# Patient Record
Sex: FEMALE | Race: WHITE | NOT HISPANIC OR LATINO | URBAN - METROPOLITAN AREA
[De-identification: names, ages, dates, MRNs, and addresses within clinical notes are randomized per-mention and may not be internally consistent; named-entity substitution may affect disease eponyms.]

---

## 2023-10-11 ENCOUNTER — CLINICAL SUPPORT (OUTPATIENT)
Dept: INTERNAL MEDICINE | Facility: CLINIC | Age: 28
End: 2023-10-11
Payer: COMMERCIAL

## 2023-10-11 ENCOUNTER — OFFICE VISIT (OUTPATIENT)
Dept: INTERNAL MEDICINE | Facility: CLINIC | Age: 28
End: 2023-10-11
Payer: COMMERCIAL

## 2023-10-11 ENCOUNTER — PATIENT MESSAGE (OUTPATIENT)
Dept: INTERNAL MEDICINE | Facility: CLINIC | Age: 28
End: 2023-10-11

## 2023-10-11 VITALS — TEMPERATURE: 97 F

## 2023-10-11 DIAGNOSIS — J02.9 SORE THROAT: ICD-10-CM

## 2023-10-11 DIAGNOSIS — R05.9 COUGH, UNSPECIFIED TYPE: Primary | ICD-10-CM

## 2023-10-11 LAB
CTP QC/QA: YES
CTP QC/QA: YES
MOLECULAR STREP A: NEGATIVE
POC MOLECULAR INFLUENZA A AGN: NEGATIVE
POC MOLECULAR INFLUENZA B AGN: NEGATIVE
SARS-COV-2 RNA RESP QL NAA+PROBE: NOT DETECTED

## 2023-10-11 PROCEDURE — 99999 PR PBB SHADOW E&M-EST. PATIENT-LVL I: ICD-10-PCS | Mod: PBBFAC,,,

## 2023-10-11 PROCEDURE — 99203 OFFICE O/P NEW LOW 30 MIN: CPT | Mod: 95,,, | Performed by: STUDENT IN AN ORGANIZED HEALTH CARE EDUCATION/TRAINING PROGRAM

## 2023-10-11 PROCEDURE — 87635 SARS-COV-2 COVID-19 AMP PRB: CPT | Performed by: STUDENT IN AN ORGANIZED HEALTH CARE EDUCATION/TRAINING PROGRAM

## 2023-10-11 PROCEDURE — 99203 PR OFFICE/OUTPT VISIT, NEW, LEVL III, 30-44 MIN: ICD-10-PCS | Mod: 95,,, | Performed by: STUDENT IN AN ORGANIZED HEALTH CARE EDUCATION/TRAINING PROGRAM

## 2023-10-11 PROCEDURE — 87651 STREP A DNA AMP PROBE: CPT | Mod: QW,NDTC,, | Performed by: STUDENT IN AN ORGANIZED HEALTH CARE EDUCATION/TRAINING PROGRAM

## 2023-10-11 PROCEDURE — 99999 PR PBB SHADOW E&M-EST. PATIENT-LVL I: CPT | Mod: PBBFAC,,,

## 2023-10-11 PROCEDURE — 87502 POCT INFLUENZA A/B MOLECULAR: ICD-10-PCS | Mod: QW,NDTC,, | Performed by: STUDENT IN AN ORGANIZED HEALTH CARE EDUCATION/TRAINING PROGRAM

## 2023-10-11 PROCEDURE — 87502 INFLUENZA DNA AMP PROBE: CPT | Mod: QW,NDTC,, | Performed by: STUDENT IN AN ORGANIZED HEALTH CARE EDUCATION/TRAINING PROGRAM

## 2023-10-11 PROCEDURE — 87651 POCT STREP A MOLECULAR: ICD-10-PCS | Mod: QW,NDTC,, | Performed by: STUDENT IN AN ORGANIZED HEALTH CARE EDUCATION/TRAINING PROGRAM

## 2023-10-11 RX ORDER — AZELASTINE 1 MG/ML
2 SPRAY, METERED NASAL 2 TIMES DAILY
Qty: 30 ML | Refills: 2 | Status: SHIPPED | OUTPATIENT
Start: 2023-10-11 | End: 2024-10-10

## 2023-10-11 RX ORDER — PROMETHAZINE HYDROCHLORIDE AND DEXTROMETHORPHAN HYDROBROMIDE 6.25; 15 MG/5ML; MG/5ML
5 SYRUP ORAL EVERY 6 HOURS PRN
Qty: 120 ML | Refills: 0 | Status: SHIPPED | OUTPATIENT
Start: 2023-10-11 | End: 2023-10-21

## 2023-10-11 RX ORDER — BENZONATATE 100 MG/1
100 CAPSULE ORAL 3 TIMES DAILY PRN
Qty: 30 CAPSULE | Refills: 0 | Status: SHIPPED | OUTPATIENT
Start: 2023-10-11 | End: 2023-10-21

## 2023-10-11 NOTE — PROGRESS NOTES
The patient's location is:  [place]  The chief complaint leading to consultation is:  Cough, unspecified type [R05.9]    Visit type: audiovisual    Time spent in discussion with the patient: 9 minutes  14 minutes of total time spent on the encounter. This includes time spent in discussion with the patient and time preparing for the patient appointment: review of tests, obtaining and/or reviewing separately obtained history, documenting clinical information in the electronic or other health record, independently interpreting results (not separately reported), and communicating results to the patient/family/caregiver or care coordination (not separately reported).     Each patient to whom he or she provides medical services by telemedicine is: (1) informed of the relationship between the physician and patient and the respective role of any other health care provider with respect to management of the patient; and (2) notified that he or she may decline to receive medical services by telemedicine and may withdraw from such care at any time.      Subjective:   Torie Wallace is a 28 y.o. female who presents for Cough, unspecified type [R05.9].       Patient is new to me. Here today for the following:    Endorses sinus congestion, sinus pressure, non-productive cough, chills, sore throat, and nausea starting Saturday.  Denies fever, SOB.   Has tried Dayquil and Nyquil without significant improvement.  Tolerating PO intake without difficulty.   Endorses roommate was sick with similar illness recently.   Tested negative for COVID on Sunday.      Cough  This is a new problem. The current episode started in the past 7 days. The problem has been waxing and waning. The problem occurs hourly. The cough is Productive of sputum. Associated symptoms include ear congestion, ear pain, headaches, nasal congestion, rhinorrhea and a sore throat. Pertinent negatives include no chest pain, chills, fever, heartburn, hemoptysis, myalgias,  postnasal drip, rash, shortness of breath, sweats, weight loss or wheezing. Nothing aggravates the symptoms. She has tried OTC cough suppressant for the symptoms. The treatment provided mild relief. There is no history of asthma, bronchiectasis, bronchitis, COPD, emphysema, environmental allergies or pneumonia.        Review of Systems   Constitutional:  Negative for chills, fever and weight loss.   HENT:  Positive for ear pain, rhinorrhea and sore throat. Negative for postnasal drip.    Respiratory:  Positive for cough. Negative for hemoptysis, shortness of breath and wheezing.    Cardiovascular:  Negative for chest pain.   Gastrointestinal:  Negative for heartburn.   Musculoskeletal:  Negative for myalgias.   Skin:  Negative for rash.   Allergic/Immunologic: Negative for environmental allergies.   Neurological:  Positive for headaches.         Current Outpatient Medications   Medication Instructions    azelastine (ASTELIN) 274 mcg, Nasal, 2 times daily    benzonatate (TESSALON) 100 mg, Oral, 3 times daily PRN    promethazine-dextromethorphan (PROMETHAZINE-DM) 6.25-15 mg/5 mL Syrp 5 mLs, Oral, Every 6 hours PRN       Objective:     Vitals:    10/11/23 1301   Temp: 97.3 °F (36.3 °C)        Physical Exam  Constitutional:       General: She is not in acute distress.     Appearance: Normal appearance. She is not ill-appearing or diaphoretic.   Neurological:      Mental Status: She is alert.   Psychiatric:         Attention and Perception: Attention normal.         Mood and Affect: Mood and affect normal.         Speech: Speech normal.           Assessment/Plan:       Cough, unspecified type  Sore throat  For body aches, headaches, and/or fevers:   - Tylenol 1,000 mg every 8 hours or 500 mg every 4 hours.    - Ibuprofen 600 mg every 6 hours.  For chest congestion try Mucinex 1,200 mg twice daily (must be well hydrated for the medication to work).   Sore throat can also be treated with warm tea with honey and salt water  gargling (8 oz warm water with 1/4 tsp salt).   Try nasal saline rinses using only sterile or distilled water 1-2 times daily.   Ensure adequate hydration.   Recommend for every 3 glasses of water 1 glass of electrolyte solution such as Pedialyte.  Sent promethazine-DM for cough to pharmacy.   Sent azelastine for sinus congestion to pharmacy.    Return to clinic in 3-5 days if symptoms worsening or unimproved.  -     azelastine (ASTELIN) 137 mcg (0.1 %) nasal spray; 2 sprays (274 mcg total) by Nasal route 2 (two) times daily.  -     promethazine-dextromethorphan (PROMETHAZINE-DM) 6.25-15 mg/5 mL Syrp; Take 5 mLs by mouth every 6 (six) hours as needed (cough).  -     POCT Strep A, Molecular  -     POCT Influenza A/B Molecular  -     COVID-19 Routine Screening  -     benzonatate (TESSALON) 100 MG capsule; Take 1 capsule (100 mg total) by mouth 3 (three) times daily as needed for Cough.        Jazmin Gerardo MD  10/11/2023

## 2024-03-28 ENCOUNTER — OFFICE VISIT (OUTPATIENT)
Dept: OBSTETRICS AND GYNECOLOGY | Facility: CLINIC | Age: 29
End: 2024-03-28
Attending: OBSTETRICS & GYNECOLOGY
Payer: COMMERCIAL

## 2024-03-28 VITALS
BODY MASS INDEX: 26.46 KG/M2 | SYSTOLIC BLOOD PRESSURE: 122 MMHG | WEIGHT: 155 LBS | HEIGHT: 64 IN | DIASTOLIC BLOOD PRESSURE: 68 MMHG

## 2024-03-28 DIAGNOSIS — Z01.419 WELL FEMALE EXAM WITH ROUTINE GYNECOLOGICAL EXAM: Primary | ICD-10-CM

## 2024-03-28 DIAGNOSIS — Z11.3 SCREENING FOR VENEREAL DISEASE: ICD-10-CM

## 2024-03-28 PROCEDURE — 87491 CHLMYD TRACH DNA AMP PROBE: CPT | Performed by: OBSTETRICS & GYNECOLOGY

## 2024-03-28 PROCEDURE — 88175 CYTOPATH C/V AUTO FLUID REDO: CPT | Performed by: OBSTETRICS & GYNECOLOGY

## 2024-03-28 PROCEDURE — 99999 PR PBB SHADOW E&M-EST. PATIENT-LVL III: CPT | Mod: PBBFAC,,, | Performed by: OBSTETRICS & GYNECOLOGY

## 2024-03-28 PROCEDURE — 87624 HPV HI-RISK TYP POOLED RSLT: CPT | Performed by: OBSTETRICS & GYNECOLOGY

## 2024-03-28 PROCEDURE — 3078F DIAST BP <80 MM HG: CPT | Mod: CPTII,S$GLB,, | Performed by: OBSTETRICS & GYNECOLOGY

## 2024-03-28 PROCEDURE — 1159F MED LIST DOCD IN RCRD: CPT | Mod: CPTII,S$GLB,, | Performed by: OBSTETRICS & GYNECOLOGY

## 2024-03-28 PROCEDURE — 3074F SYST BP LT 130 MM HG: CPT | Mod: CPTII,S$GLB,, | Performed by: OBSTETRICS & GYNECOLOGY

## 2024-03-28 PROCEDURE — 1160F RVW MEDS BY RX/DR IN RCRD: CPT | Mod: CPTII,S$GLB,, | Performed by: OBSTETRICS & GYNECOLOGY

## 2024-03-28 PROCEDURE — 99385 PREV VISIT NEW AGE 18-39: CPT | Mod: S$GLB,,, | Performed by: OBSTETRICS & GYNECOLOGY

## 2024-03-28 PROCEDURE — 3008F BODY MASS INDEX DOCD: CPT | Mod: CPTII,S$GLB,, | Performed by: OBSTETRICS & GYNECOLOGY

## 2024-03-28 RX ORDER — VALACYCLOVIR HYDROCHLORIDE 500 MG/1
500 TABLET, FILM COATED ORAL DAILY
Qty: 90 TABLET | Refills: 3 | Status: SHIPPED | OUTPATIENT
Start: 2024-03-28 | End: 2025-03-28

## 2024-03-28 RX ORDER — VALACYCLOVIR HYDROCHLORIDE 500 MG/1
500 TABLET, FILM COATED ORAL 2 TIMES DAILY
COMMUNITY
End: 2024-03-28 | Stop reason: SDUPTHER

## 2024-03-28 NOTE — PROGRESS NOTES
SUBJECTIVE:   29 y.o. female   for routine gyn exam. No LMP recorded (lmp unknown)..  She has no unusual complaints. She has h/o PCOS and has h/o irregular cycles. Has been on OCP's in the past, but feels better off of them. Sometimes takes prometrium if amenorrhea for more than 3 months.  Uses condoms for contraception. Desires STD screen and annual labs.         Past Medical History:   Diagnosis Date    Abnormal Pap smear of cervix     HSV-2 (herpes simplex virus 2) infection     PCOS (polycystic ovarian syndrome)      Past Surgical History:   Procedure Laterality Date    AUGMENTATION OF BREAST      DILATION AND CURETTAGE OF UTERUS      AUB    PELVIC LAPAROSCOPY      pelvic pain- no endo     Social History     Socioeconomic History    Marital status: Single   Tobacco Use    Smoking status: Unknown   Substance and Sexual Activity    Sexual activity: Yes     Birth control/protection: None, Condom     Family History   Problem Relation Age of Onset    Ovarian cancer Paternal Grandmother     Breast cancer Neg Hx     Colon cancer Neg Hx      OB History    Para Term  AB Living   0 0 0 0 0 0   SAB IAB Ectopic Multiple Live Births   0 0 0 0 0         Current Outpatient Medications   Medication Sig Dispense Refill    azelastine (ASTELIN) 137 mcg (0.1 %) nasal spray 2 sprays (274 mcg total) by Nasal route 2 (two) times daily. (Patient not taking: Reported on 3/28/2024) 30 mL 2    valACYclovir (VALTREX) 500 MG tablet Take 1 tablet (500 mg total) by mouth once daily. 90 tablet 3     No current facility-administered medications for this visit.     Allergies: Tapentadol     ROS:  Constitutional: no weight loss, weight gain, fever, fatigue  Eyes:  No vision changes, glasses/contacts  ENT/Mouth: No ulcers, sinus problems, ears ringing, headache  Cardiovascular: No inability to lie flat, chest pain, exercise intolerance, swelling, heart palpitations  Respiratory: No wheezing, coughing blood, shortness of  "breath, or cough  Gastrointestinal: No diarrhea, bloody stool, nausea/vomiting, constipation, gas, hemorrhoids  Genitourinary: No blood in urine, painful urination, urgency of urination, frequency of urination, incomplete emptying, incontinence, abnormal bleeding, painful periods, heavy periods, vaginal discharge, vaginal odor, painful intercourse, sexual problems, bleeding after intercourse.  Musculoskeletal: No muscle weakness  Skin/Breast: No painful breasts, nipple discharge, masses, rash, ulcers  Neurological: No passing out, seizures, numbness, headache  Endocrine: No diabetes, hypothyroid, hyperthyroid, hot flashes, hair loss, abnormal hair growth, acne  Psychiatric: No depression, crying  Hematologic: No bruises, bleeding, swollen lymph nodes, anemia.      OBJECTIVE:   The patient appears well, alert, oriented x 3, in no distress.  /68   Ht 5' 4" (1.626 m)   Wt 70.3 kg (154 lb 15.7 oz)   LMP  (LMP Unknown)   BMI 26.60 kg/m²   NECK: no thyromegaly, trachea midline  SKIN: no acne, striae, hirsutism  CHEST: CTAB  CV: RRR  BREAST EXAM: breasts appear normal, no suspicious masses, no skin or nipple changes or axillary nodes  ABDOMEN: no hernias, masses, or hepatosplenomegaly  GENITALIA: normal external genitalia, no erythema, no discharge  URETHRA: normal urethra, normal urethral meatus  VAGINA: Normal  CERVIX: no lesions or cervical motion tenderness  UTERUS: normal size, contour, position, consistency, mobility, non-tender  ADNEXA: no mass, fullness, tenderness      ASSESSMENT:   1. Well female exam with routine gynecological exam  Liquid-Based Pap Smear, Screening    Lipid Panel    CBC Auto Differential    Comprehensive Metabolic Panel    TSH    T4, Free      2. Screening for venereal disease  C. trachomatis/N. gonorrhoeae by AMP DNA          PLAN:   Orders Placed This Encounter    C. trachomatis/N. gonorrhoeae by AMP DNA    Lipid Panel    CBC Auto Differential    Comprehensive Metabolic Panel    TSH "    T4, Free    Liquid-Based Pap Smear, Screening    valACYclovir (VALTREX) 500 MG tablet     Discussed HSV, Valtrex, PCOS, tracking cycles.  Discussed healthy lifestyle including regular exercise, healthy eating, etc.  Return to clinic in 1 year

## 2024-03-30 LAB
C TRACH DNA SPEC QL NAA+PROBE: NOT DETECTED
N GONORRHOEA DNA SPEC QL NAA+PROBE: NOT DETECTED

## 2024-04-08 LAB

## 2024-07-24 ENCOUNTER — E-VISIT (OUTPATIENT)
Dept: FAMILY MEDICINE | Facility: CLINIC | Age: 29
End: 2024-07-24
Payer: COMMERCIAL

## 2024-07-24 ENCOUNTER — ON-DEMAND VIRTUAL (OUTPATIENT)
Dept: URGENT CARE | Facility: CLINIC | Age: 29
End: 2024-07-24
Payer: COMMERCIAL

## 2024-07-24 VITALS — BODY MASS INDEX: 25.61 KG/M2 | WEIGHT: 150 LBS | HEIGHT: 64 IN

## 2024-07-24 DIAGNOSIS — U07.1 COVID: Primary | ICD-10-CM

## 2024-07-24 DIAGNOSIS — R05.2 SUBACUTE COUGH: Primary | ICD-10-CM

## 2024-07-24 DIAGNOSIS — J02.9 SORE THROAT: ICD-10-CM

## 2024-07-24 DIAGNOSIS — U07.1 COVID: ICD-10-CM

## 2024-07-24 PROCEDURE — 99213 OFFICE O/P EST LOW 20 MIN: CPT | Mod: 95,,, | Performed by: PHYSICIAN ASSISTANT

## 2024-07-24 RX ORDER — PROMETHAZINE HYDROCHLORIDE AND DEXTROMETHORPHAN HYDROBROMIDE 6.25; 15 MG/5ML; MG/5ML
5 SYRUP ORAL EVERY 6 HOURS PRN
Qty: 118 ML | Refills: 1 | Status: SHIPPED | OUTPATIENT
Start: 2024-07-24 | End: 2024-08-03

## 2024-07-24 RX ORDER — NAPROXEN 500 MG/1
500 TABLET ORAL 2 TIMES DAILY PRN
Qty: 14 TABLET | Refills: 0 | Status: SHIPPED | OUTPATIENT
Start: 2024-07-24 | End: 2024-07-31

## 2024-07-24 RX ORDER — NIRMATRELVIR AND RITONAVIR 300-100 MG
KIT ORAL
Qty: 30 TABLET | Refills: 0 | Status: SHIPPED | OUTPATIENT
Start: 2024-07-24 | End: 2024-07-29

## 2024-07-24 NOTE — PATIENT INSTRUCTIONS
Continue to isolate. Rest, drink plenty of fluids (goal is urine to be light yellow/clear for adequate hydration).   2. Recommend warm salt gargles or tea with honey for throat discomfort and cough. May use nasal saline rinses/netti pot for nasal/sinus congestion.  Tylenol or Ibuprofen recommended as needed for fever or pain control.  3. I have sent Paxlovid to your pharmacy, please take as directed.  Side effects reviewed.   4. Monitor your symptoms for worsening and seek immediate medical attention at emergency room if develop chest pain, difficulty breathing, lightheadedness/dizziness, syncope, dark colored urine. Would also recommend to purchase a pulse oximetry to monitor your oxygen level. If it goes below 95% please go to nearest urgent care or emergency room for in person evaluation. Please reconnect for another online visit or see your PCP or urgent care provider if symptoms worsen or new symptoms appear at any point.  5. Isolation period may end when, for at least 24 hours, symptoms are improving overall, and fever has been gone without use of a fever-reducing medication. For an additional 5 days, you will need to continue to take prevention strategies such as wear a mask at all times, good hygiene practices, keeping a distance from others.  6. You must understand that you've received a Telehealth Urgent Care treatment only and that you may be released before all your medical problems are known or treated. You, the patient, will arrange for follow up care as instructed.

## 2024-07-24 NOTE — PROGRESS NOTES
"Patient ID: Torie Wallace is a 29 y.o. female.    Chief Complaint: URI (Entered automatically based on patient selection in [email or web address].) and Cough          274}  The patient initiated a request through [email or web address] on 7/24/2024 for evaluation and management with a chief complaint of URI (Entered automatically based on patient selection in [email or web address].) and Cough     I evaluated the questionnaire submission on 07/24/2024 .    Total Time (in minutes): 5     [facility] Peq E-Visit Covid    7/24/2024  2:04 PM CDT - Filed by Patient   Do you agree to participate in an E-Visit? Yes   If you have any of the following symptoms, go to your local emergency room or call 911: I acknowledge   Are you pregnant, could you be pregnant, or are you breast feeding? None of the above   What is the main issue you would like addressed today? Covid   Do you think you might have COVID or the Flu? Yes COVID   Have you tested positive for COVID or Flu? Yes COVID   What symptoms do you have? Chills;  Cough;  Fatigue;  Fever;  Headache;  Nasal congestion;  Muscle or body aches;  Nausea;  Runny nose;  Sore throat   When did your symptoms first appear? 7/23/2024   List what you have done or taken to help your symptoms. Dayquil & ibuprofen - havent really helped   Provide any additional information you feel is important. Had a virtual visit today and was prescribed Paxlovid. I am wondering if i could also get something to relieve my cough and sore throat. My throat is currently so scratchy it hurts to swallow and i am coughing constantly   Please attach any relevant images or files    Are you able to take your vital signs? No          There are no problems to display for this patient.     Recent Labs Obtained:  No results found for: "WBC", "HGB", "HCT", "MCV", "PLT", "NA", "K", "GLU", "CREATININE", "EGFRNORACEVR", "HGBA1C", "TSH"   Review of patient's allergies indicates:   Allergen Reactions    Tapentadol Itching       Encounter Diagnoses   Name Primary?    " Subacute cough Yes    COVID     Sore throat         No orders of the defined types were placed in this encounter.     Medications Ordered This Encounter   Medications    naproxen (NAPROSYN) 500 MG tablet     Sig: Take 1 tablet (500 mg total) by mouth 2 (two) times daily as needed (pain).     Dispense:  14 tablet     Refill:  0    promethazine-dextromethorphan (PROMETHAZINE-DM) 6.25-15 mg/5 mL Syrp     Sig: Take 5 mLs by mouth every 6 (six) hours as needed (cough).     Dispense:  118 mL     Refill:  1        E-Visit Time Tracking:    Day 1 Time (in minutes): 5    Total Time (in minutes): 5      274}

## 2024-07-24 NOTE — PROGRESS NOTES
"Subjective:      Patient ID: Torie Wallace is a 29 y.o. female.    Vitals:  height is 5' 4" (1.626 m) and weight is 68 kg (150 lb).     Chief Complaint: COVID-19 Concerns      Visit Type: TELE AUDIOVISUAL    Present with the patient at the time of consultation: TELEMED PRESENT WITH PATIENT: None outside in [place]    Past Medical History:   Diagnosis Date    Abnormal Pap smear of cervix     HSV-2 (herpes simplex virus 2) infection     PCOS (polycystic ovarian syndrome)      Past Surgical History:   Procedure Laterality Date    AUGMENTATION OF BREAST      DILATION AND CURETTAGE OF UTERUS      AUB    PELVIC LAPAROSCOPY      pelvic pain- no endo     Review of patient's allergies indicates:   Allergen Reactions    Tapentadol Itching     Current Outpatient Medications on File Prior to Visit   Medication Sig Dispense Refill    azelastine (ASTELIN) 137 mcg (0.1 %) nasal spray 2 sprays (274 mcg total) by Nasal route 2 (two) times daily. (Patient not taking: Reported on 3/28/2024) 30 mL 2    valACYclovir (VALTREX) 500 MG tablet Take 1 tablet (500 mg total) by mouth once daily. 90 tablet 3     No current facility-administered medications on file prior to visit.     Family History   Problem Relation Name Age of Onset    Ovarian cancer Paternal Grandmother      Breast cancer Neg Hx      Colon cancer Neg Hx             [facility] Peq Odvv Intake    7/24/2024 11:16 AM CDT - Filed by Patient   What is your current physical address in the event of a medical emergency? [address] [place] [address]   Are you able to take your vital signs? No   Please attach any relevant images or files          HPI  30yo female presents with c/o sore throat x yesterday, worsening neck glands swollen, headache, chills, sweats, nasal congestion, cough. Does feel more winded than normal with walking stairs but otherwise no dyspnea, chest tightness, wheezing. Feel like got hit by a bus. Positive home COVID test.     Denies POP. "         Constitution: Positive for chills, sweating, fatigue and fever.   HENT:  Positive for congestion and sore throat. Negative for ear pain and trouble swallowing.    Respiratory:  Positive for cough and shortness of breath (with exertion going up the stairs). Negative for chest tightness and wheezing.    Gastrointestinal:  Negative for abdominal pain, nausea, vomiting and diarrhea.   Skin:  Negative for rash.   Neurological:  Positive for headaches. Negative for dizziness and light-headedness.        Objective:   The physical exam was conducted virtually.  Physical Exam   Constitutional: She is oriented to person, place, and time.  Non-toxic appearance. She does not appear ill. No distress.   HENT:   Head: Normocephalic and atraumatic.   Nose: Right sinus exhibits frontal sinus tenderness. Right sinus exhibits no maxillary sinus tenderness. Left sinus exhibits frontal sinus tenderness. Left sinus exhibits no maxillary sinus tenderness.   Neck: Neck supple.   Pulmonary/Chest: Effort normal. No respiratory distress.         Comments: Walking during video, no increased work of breathing noted.     Abdominal: Normal appearance.   Neurological: She is alert and oriented to person, place, and time. Coordination normal.   Skin: Skin is dry, not diaphoretic, not pale and no rash.   Psychiatric: Her behavior is normal. Judgment and thought content normal.       Assessment:     1. COVID        Plan:       COVID      Pt interested in antiviral therapy. Paxlovid treatment reviewed - indications, benefits, side effects, interactions. Pt would like to proceed.     Continue to isolate. Rest, drink plenty of fluids (goal is urine to be light yellow/clear for adequate hydration).   2. Recommend warm salt gargles or tea with honey for throat discomfort and cough. May use nasal saline rinses/netti pot for nasal/sinus congestion.  Tylenol or Ibuprofen recommended as needed for fever or pain control.  3. I have sent Paxlovid to  your pharmacy, please take as directed.    4. Monitor your symptoms for worsening and seek immediate medical attention at emergency room if develop chest pain, difficulty breathing, lightheadedness/dizziness, syncope, dark colored urine. Would also recommend to purchase a pulse oximetry to monitor your oxygen level. If it goes below 95% please go to nearest urgent care or emergency room for in person evaluation. Please reconnect for another online visit or see your PCP or urgent care provider if symptoms worsen or new symptoms appear at any point.  5. Isolation period may end when, for at least 24 hours, symptoms are improving overall, and fever has been gone without use of a fever-reducing medication. For an additional 5 days, you will need to continue to take prevention strategies such as wear a mask at all times, good hygiene practices, keeping a distance from others.  6. You must understand that you've received a Telehealth Urgent Care treatment only and that you may be released before all your medical problems are known or treated. You, the patient, will arrange for follow up care as instructed.     Patient indicates understanding and agreement with plan.

## 2024-07-24 NOTE — LETTER
July 24, 2024    Torie Wallace  [address] [facility] [facility] [address]             Virtual Visit - Urgent Care  Urgent Care  [address] [facility] [id number]   July 24, 2024     Patient: Torie Wallace   YOB: 1995   Date of Visit: 7/24/2024       To Whom it May Concern:    Torie Wallace was seen virtually on 7/24/2024. Please excuse her from work today 7/24/2024 through Friday 7/26/2024.    If you have any questions or concerns, please don't hesitate to call.    Sincerely,         Inse Thompson PA-C

## 2024-08-02 ENCOUNTER — OFFICE VISIT (OUTPATIENT)
Dept: OBSTETRICS AND GYNECOLOGY | Facility: CLINIC | Age: 29
End: 2024-08-02
Payer: COMMERCIAL

## 2024-08-02 ENCOUNTER — PATIENT MESSAGE (OUTPATIENT)
Dept: OBSTETRICS AND GYNECOLOGY | Facility: CLINIC | Age: 29
End: 2024-08-02

## 2024-08-02 VITALS
WEIGHT: 148.38 LBS | DIASTOLIC BLOOD PRESSURE: 88 MMHG | SYSTOLIC BLOOD PRESSURE: 142 MMHG | BODY MASS INDEX: 25.33 KG/M2 | HEIGHT: 64 IN

## 2024-08-02 DIAGNOSIS — M54.9 BACK PAIN, UNSPECIFIED BACK LOCATION, UNSPECIFIED BACK PAIN LATERALITY, UNSPECIFIED CHRONICITY: Primary | ICD-10-CM

## 2024-08-02 DIAGNOSIS — R10.2 PELVIC CRAMPING: ICD-10-CM

## 2024-08-02 DIAGNOSIS — N89.8 VAGINAL DISCHARGE: ICD-10-CM

## 2024-08-02 LAB
B-HCG UR QL: NEGATIVE
BILIRUB UR QL STRIP: NEGATIVE
CTP QC/QA: YES
GLUCOSE UR QL STRIP: NEGATIVE
KETONES UR QL STRIP: NEGATIVE
LEUKOCYTE ESTERASE UR QL STRIP: NEGATIVE
PH, POC UA: 7
POC BLOOD, URINE: NEGATIVE
POC NITRATES, URINE: NEGATIVE
PROT UR QL STRIP: NEGATIVE
SP GR UR STRIP: 1.01 (ref 1–1.03)
UROBILINOGEN UR STRIP-ACNC: NORMAL (ref 0.1–1.1)

## 2024-08-02 PROCEDURE — 87086 URINE CULTURE/COLONY COUNT: CPT | Performed by: OBSTETRICS & GYNECOLOGY

## 2024-08-02 PROCEDURE — 87591 N.GONORRHOEAE DNA AMP PROB: CPT | Performed by: OBSTETRICS & GYNECOLOGY

## 2024-08-02 PROCEDURE — 99999 PR PBB SHADOW E&M-EST. PATIENT-LVL III: CPT | Mod: PBBFAC,,, | Performed by: OBSTETRICS & GYNECOLOGY

## 2024-08-02 RX ORDER — ONDANSETRON 4 MG/1
4 TABLET, ORALLY DISINTEGRATING ORAL EVERY 6 HOURS PRN
Qty: 30 TABLET | Refills: 0 | Status: SHIPPED | OUTPATIENT
Start: 2024-08-02 | End: 2024-09-01

## 2024-08-02 RX ORDER — CYCLOBENZAPRINE HCL 5 MG
5 TABLET ORAL EVERY 8 HOURS PRN
Qty: 30 TABLET | Refills: 0 | Status: SHIPPED | OUTPATIENT
Start: 2024-08-02

## 2024-08-02 NOTE — PROGRESS NOTES
Subjective     Patient ID: Torie Wallace is a 29 y.o. female.    Chief Complaint:  Low-back Pain and Nausea      History of Present Illness  30yo  presents for worsening right lower back/pelvic pain and nausea.  States pain started about 10 days ago, then had cough/covid symptoms for which she was treated and now better from that standpoint.  However, the pain has remained persistent, worse with certain movements, like crossing legs.  Denies fevers/chills.  Has been sexually active during this time without worsening pain.  She has also noticed increased urinary frequency and urgency, but denies dysuria or hematuria.  No incontinence.  Also reports nausea with the pain, no vomiting, but decreased appetite.  H/o irregular cycles, often has to take progesterone to induce cycle, but had normal cycle on her own .  Heavier than usual but had not had prior cycle since .  Not currently on any contraception, does not desire any.  No other complaints today.     Low-back Pain  Associated symptoms include abdominal pain, anorexia and nausea. Pertinent negatives include no chest pain, chills, fever, headaches, rash or vomiting.   Nausea  Associated symptoms include abdominal pain, anorexia and nausea. Pertinent negatives include no chest pain, chills, fever, headaches, rash or vomiting.   Pelvic Pain  The patient's primary symptoms include pelvic pain. This is a new problem. The current episode started in the past 7 days. The problem occurs constantly. The problem has been gradually worsening. The pain is severe. The problem affects the right side. She is not pregnant. Associated symptoms include abdominal pain, anorexia, back pain, diarrhea, flank pain, frequency, nausea and urgency. Pertinent negatives include no chills, constipation, discolored urine, dysuria, fever, headaches, hematuria, painful intercourse, rash or vomiting. The symptoms are aggravated by activity and heavy lifting. She has tried heating pad,  "NSAIDs and warm bath for the symptoms. The treatment provided no relief. She is sexually active. No, her partner does not have an STD. She uses nothing for contraception. Her menstrual history has been irregular. Her past medical history is significant for a gynecological surgery, herpes simplex, metrorrhagia and ovarian cysts. There is no history of an abdominal surgery, a  section, an ectopic pregnancy, endometriosis, menorrhagia, miscarriage, perineal abscess, PID, an STD, a terminated pregnancy or vaginosis.         GYN & OB History  Patient's last menstrual period was 2024.   Date of Last Pap: 2024    OB History    Para Term  AB Living   0 0 0 0 0 0   SAB IAB Ectopic Multiple Live Births   0 0 0 0 0       Review of Systems  Review of Systems   Constitutional:  Negative for chills and fever.   Respiratory:  Negative for shortness of breath.    Cardiovascular:  Negative for chest pain.   Gastrointestinal:  Positive for abdominal pain, anorexia, diarrhea and nausea. Negative for constipation and vomiting.   Genitourinary:  Positive for flank pain, frequency, pelvic pain and urgency. Negative for dysuria, hematuria and menorrhagia.   Musculoskeletal:  Positive for back pain.   Integumentary:  Negative for rash.   Neurological:  Negative for headaches.          Objective   Physical Exam    BP (!) 142/88   Ht 5' 4" (1.626 m)   Wt 67.3 kg (148 lb 5.9 oz)   LMP 2024   BMI 25.47 kg/m²     Gen: NAD  Resp: Normal respiratory effort  Abd: soft, NT, no guarding/rebound.  +TTP in right lower back/flank, but no CVAT.   Pelvic: Normal-appearing external female genitalia.  Small amount of thin white discharge noted.  No CMT.  Uterus small, mobile, nontender.  No adnexal masses or tenderness.    Ext: normal ROM  Psych: appropriate affect  Neuro: grossly intact         Assessment and Plan     Torie Burris" was seen today for low-back pain and nausea.    Diagnoses and all orders for this " visit:    Back pain, unspecified back location, unspecified back pain laterality, unspecified chronicity  -     POCT Urinalysis, Dipstick, Automated, W/O Scope  -     US Pelvis Comp with Transvag NON-OB (xpd; Future  -     US Kidney; Future    Pelvic cramping  -     C. trachomatis/N. gonorrhoeae by AMP DNA [facility]; Vagina  -     POCT Urine Pregnancy  -     US Pelvis Comp with Transvag NON-OB (xpd; Future  -     US Kidney; Future  -     CULTURE, URINE  -     C. trachomatis/N. gonorrhoeae by AMP DNA [facility]; Cervix    Vaginal discharge  -     Vaginosis Screen by DNA Probe; Future  -     Vaginosis Screen by DNA Probe  -     CULTURE, URINE  -     C. trachomatis/N. gonorrhoeae by AMP DNA [facility]; Cervix          Plan:  Symptoms and exam reviewed with patient - overall reassuring exam, but still concerning for possible kidney stone vs ovarian cyst vs musculoskeletal.   Vaginal and urine cultures pending.  Will get pelvic and renal US.   eRx flexeril and zofran, can use with NSAIDs prn.   ED precautions given.    Counseling done, precautions given, all questions answered.  Will message with results.  RTC prn.

## 2024-08-03 LAB — BACTERIA UR CULT: ABNORMAL

## 2024-08-04 RX ORDER — METRONIDAZOLE 500 MG/1
500 TABLET ORAL EVERY 12 HOURS
Qty: 14 TABLET | Refills: 0 | Status: SHIPPED | OUTPATIENT
Start: 2024-08-04 | End: 2024-08-11

## 2024-08-04 RX ORDER — SULFAMETHOXAZOLE AND TRIMETHOPRIM 800; 160 MG/1; MG/1
1 TABLET ORAL 2 TIMES DAILY
Qty: 14 TABLET | Refills: 0 | Status: SHIPPED | OUTPATIENT
Start: 2024-08-04 | End: 2024-08-11

## 2024-08-06 ENCOUNTER — HOSPITAL ENCOUNTER (OUTPATIENT)
Dept: RADIOLOGY | Facility: OTHER | Age: 29
Discharge: HOME | End: 2024-08-06
Attending: OBSTETRICS & GYNECOLOGY
Payer: COMMERCIAL

## 2024-08-06 ENCOUNTER — TELEPHONE (OUTPATIENT)
Dept: OBSTETRICS AND GYNECOLOGY | Facility: CLINIC | Age: 29
End: 2024-08-06
Payer: COMMERCIAL

## 2024-08-06 DIAGNOSIS — R10.2 PELVIC CRAMPING: ICD-10-CM

## 2024-08-06 DIAGNOSIS — M54.9 BACK PAIN, UNSPECIFIED BACK LOCATION, UNSPECIFIED BACK PAIN LATERALITY, UNSPECIFIED CHRONICITY: ICD-10-CM

## 2024-08-06 PROCEDURE — 76775 US EXAM ABDO BACK WALL LIM: CPT | Mod: TC

## 2024-08-06 PROCEDURE — 76775 US EXAM ABDO BACK WALL LIM: CPT | Mod: 26,,, | Performed by: RADIOLOGY

## 2024-08-06 PROCEDURE — 76830 TRANSVAGINAL US NON-OB: CPT | Mod: 26,,, | Performed by: RADIOLOGY

## 2024-08-06 PROCEDURE — 76856 US EXAM PELVIC COMPLETE: CPT | Mod: 26,,, | Performed by: RADIOLOGY

## 2024-08-06 PROCEDURE — 76856 US EXAM PELVIC COMPLETE: CPT | Mod: TC

## 2024-08-06 PROCEDURE — 76830 TRANSVAGINAL US NON-OB: CPT | Mod: TC

## 2024-08-07 ENCOUNTER — TELEPHONE (OUTPATIENT)
Dept: OBSTETRICS AND GYNECOLOGY | Facility: HOSPITAL | Age: 29
End: 2024-08-07
Payer: COMMERCIAL

## 2024-08-07 DIAGNOSIS — N92.6 IRREGULAR PERIODS/MENSTRUAL CYCLES: Primary | ICD-10-CM

## 2024-08-13 ENCOUNTER — TELEPHONE (OUTPATIENT)
Dept: OBSTETRICS AND GYNECOLOGY | Facility: CLINIC | Age: 29
End: 2024-08-13
Payer: COMMERCIAL

## 2024-08-13 DIAGNOSIS — N89.8 VAGINAL DISCHARGE: Primary | ICD-10-CM

## 2024-08-13 NOTE — TELEPHONE ENCOUNTER
----- Message from Kaylah Perry sent at 8/13/2024  9:09 AM CDT -----  Name of Who is Calling:NANDINI BAKER [[id number]]                What is the request in detail: Pt calling asking if she could have some yeast infection medication.Please call back to further assist.      [id number] - [place] [address] [facility] [address]           Can the clinic reply by [facility]: No                What Number to Call Back if not [facility]: [phone]

## 2024-08-15 ENCOUNTER — LAB VISIT (OUTPATIENT)
Dept: LAB | Facility: OTHER | Age: 29
End: 2024-08-15
Attending: OBSTETRICS & GYNECOLOGY
Payer: COMMERCIAL

## 2024-08-15 DIAGNOSIS — N92.6 IRREGULAR PERIODS/MENSTRUAL CYCLES: ICD-10-CM

## 2024-08-15 DIAGNOSIS — Z01.419 WELL FEMALE EXAM WITH ROUTINE GYNECOLOGICAL EXAM: ICD-10-CM

## 2024-08-15 LAB
ALBUMIN SERPL BCP-MCNC: 4 G/DL (ref 3.5–5.2)
ALP SERPL-CCNC: 55 U/L (ref 55–135)
ALT SERPL W/O P-5'-P-CCNC: 24 U/L (ref 10–44)
ANION GAP SERPL CALC-SCNC: 12 MMOL/L (ref 8–16)
AST SERPL-CCNC: 19 U/L (ref 10–40)
BASOPHILS # BLD AUTO: 0.04 K/UL (ref 0–0.2)
BASOPHILS NFR BLD: 0.8 % (ref 0–1.9)
BILIRUB SERPL-MCNC: 0.2 MG/DL (ref 0.1–1)
BUN SERPL-MCNC: 10 MG/DL (ref 6–20)
CALCIUM SERPL-MCNC: 9.6 MG/DL (ref 8.7–10.5)
CHLORIDE SERPL-SCNC: 103 MMOL/L (ref 95–110)
CHOLEST SERPL-MCNC: 160 MG/DL (ref 120–199)
CHOLEST/HDLC SERPL: 3 {RATIO} (ref 2–5)
CO2 SERPL-SCNC: 23 MMOL/L (ref 23–29)
CREAT SERPL-MCNC: 0.8 MG/DL (ref 0.5–1.4)
DHEA-S SERPL-MCNC: 195.5 UG/DL (ref 95.8–511.7)
DIFFERENTIAL METHOD BLD: NORMAL
EOSINOPHIL # BLD AUTO: 0.1 K/UL (ref 0–0.5)
EOSINOPHIL NFR BLD: 1.3 % (ref 0–8)
ERYTHROCYTE [DISTWIDTH] IN BLOOD BY AUTOMATED COUNT: 12.6 % (ref 11.5–14.5)
EST. GFR  (NO RACE VARIABLE): >60 ML/MIN/1.73 M^2
ESTRADIOL SERPL-MCNC: 43 PG/ML
FSH SERPL-ACNC: 9.57 MIU/ML
GLUCOSE SERPL-MCNC: 86 MG/DL (ref 70–110)
HCT VFR BLD AUTO: 45.8 % (ref 37–48.5)
HDLC SERPL-MCNC: 53 MG/DL (ref 40–75)
HDLC SERPL: 33.1 % (ref 20–50)
HGB BLD-MCNC: 15.2 G/DL (ref 12–16)
IMM GRANULOCYTES # BLD AUTO: 0.01 K/UL (ref 0–0.04)
IMM GRANULOCYTES NFR BLD AUTO: 0.2 % (ref 0–0.5)
LDLC SERPL CALC-MCNC: 80.4 MG/DL (ref 63–159)
LYMPHOCYTES # BLD AUTO: 2 K/UL (ref 1–4.8)
LYMPHOCYTES NFR BLD: 41.3 % (ref 18–48)
MCH RBC QN AUTO: 30.7 PG (ref 27–31)
MCHC RBC AUTO-ENTMCNC: 33.2 G/DL (ref 32–36)
MCV RBC AUTO: 93 FL (ref 82–98)
MONOCYTES # BLD AUTO: 0.4 K/UL (ref 0.3–1)
MONOCYTES NFR BLD: 8.8 % (ref 4–15)
NEUTROPHILS # BLD AUTO: 2.3 K/UL (ref 1.8–7.7)
NEUTROPHILS NFR BLD: 47.6 % (ref 38–73)
NONHDLC SERPL-MCNC: 107 MG/DL
NRBC BLD-RTO: 0 /100 WBC
PLATELET # BLD AUTO: 430 K/UL (ref 150–450)
PMV BLD AUTO: 9.7 FL (ref 9.2–12.9)
POTASSIUM SERPL-SCNC: 3.8 MMOL/L (ref 3.5–5.1)
PROGEST SERPL-MCNC: 0.2 NG/ML
PROLACTIN SERPL IA-MCNC: 13.9 NG/ML (ref 5.2–26.5)
PROT SERPL-MCNC: 7.2 G/DL (ref 6–8.4)
RBC # BLD AUTO: 4.95 M/UL (ref 4–5.4)
SODIUM SERPL-SCNC: 138 MMOL/L (ref 136–145)
T4 FREE SERPL-MCNC: 1.1 NG/DL (ref 0.71–1.51)
TESTOST SERPL-MCNC: 37 NG/DL (ref 5–73)
TRIGL SERPL-MCNC: 133 MG/DL (ref 30–150)
TSH SERPL DL<=0.005 MIU/L-ACNC: 1.03 UIU/ML (ref 0.4–4)
WBC # BLD AUTO: 4.8 K/UL (ref 3.9–12.7)

## 2024-08-15 PROCEDURE — 82627 DEHYDROEPIANDROSTERONE: CPT | Performed by: OBSTETRICS & GYNECOLOGY

## 2024-08-15 PROCEDURE — 84439 ASSAY OF FREE THYROXINE: CPT | Performed by: OBSTETRICS & GYNECOLOGY

## 2024-08-15 PROCEDURE — 84403 ASSAY OF TOTAL TESTOSTERONE: CPT | Performed by: OBSTETRICS & GYNECOLOGY

## 2024-08-15 PROCEDURE — 80053 COMPREHEN METABOLIC PANEL: CPT | Performed by: OBSTETRICS & GYNECOLOGY

## 2024-08-15 PROCEDURE — 82670 ASSAY OF TOTAL ESTRADIOL: CPT | Performed by: OBSTETRICS & GYNECOLOGY

## 2024-08-15 PROCEDURE — 36415 COLL VENOUS BLD VENIPUNCTURE: CPT | Performed by: OBSTETRICS & GYNECOLOGY

## 2024-08-15 PROCEDURE — 85025 COMPLETE CBC W/AUTO DIFF WBC: CPT | Performed by: OBSTETRICS & GYNECOLOGY

## 2024-08-15 PROCEDURE — 80061 LIPID PANEL: CPT | Performed by: OBSTETRICS & GYNECOLOGY

## 2024-08-15 PROCEDURE — 84146 ASSAY OF PROLACTIN: CPT | Performed by: OBSTETRICS & GYNECOLOGY

## 2024-08-15 PROCEDURE — 84443 ASSAY THYROID STIM HORMONE: CPT | Performed by: OBSTETRICS & GYNECOLOGY

## 2024-08-15 PROCEDURE — 84144 ASSAY OF PROGESTERONE: CPT | Performed by: OBSTETRICS & GYNECOLOGY

## 2024-08-15 PROCEDURE — 83001 ASSAY OF GONADOTROPIN (FSH): CPT | Performed by: OBSTETRICS & GYNECOLOGY

## 2024-08-21 ENCOUNTER — E-VISIT (OUTPATIENT)
Dept: FAMILY MEDICINE | Facility: CLINIC | Age: 29
End: 2024-08-21
Payer: COMMERCIAL

## 2024-08-21 DIAGNOSIS — J02.8 PHARYNGITIS DUE TO OTHER ORGANISM: Primary | ICD-10-CM

## 2024-08-21 RX ORDER — NAPROXEN 500 MG/1
500 TABLET ORAL 2 TIMES DAILY PRN
Qty: 14 TABLET | Refills: 0 | Status: SHIPPED | OUTPATIENT
Start: 2024-08-21 | End: 2024-08-28

## 2024-08-21 RX ORDER — AMOXICILLIN AND CLAVULANATE POTASSIUM 875; 125 MG/1; MG/1
1 TABLET, FILM COATED ORAL 2 TIMES DAILY
Qty: 14 TABLET | Refills: 0 | Status: SHIPPED | OUTPATIENT
Start: 2024-08-21 | End: 2024-08-28

## 2024-08-21 NOTE — PROGRESS NOTES
Patient ID: Torie Wallace is a 29 y.o. female.    Chief Complaint: General Illness (Entered automatically based on patient selection in [employer].)          274}  The patient initiated a request through [employer] on 8/21/2024 for evaluation and management with a chief complaint of General Illness (Entered automatically based on patient selection in [employer].)     I evaluated the questionnaire submission on 08/21/2024 .    Total Time (in minutes): 14     [facility] P[facility] Evisit [facility]group-Cough And Cold    8/21/2024  2:39 PM CDT - Filed by Patient   What do you need help with? Strep Throat   Do you agree to participate in an E-Visit? Yes   If you have any of the following symptoms, go to your local emergency room or call 911: I acknowledge   Are you pregnant, could you be pregnant, or are you breast feeding? None of the above   What is the main issue you would like addressed today? Sore throat, swollen lymph nodes, hurts to swallow, pain in ears, no fever   Do you think you might have COVID or the Flu? No   Have you tested positive for COVID or Flu? No   What symptoms do you currently have?  Sore throat;  Ear pain;  Neck pain   Have you had any of the following? Trouble swallowing   Please enter a few details about your swallowing, breathing, or visual problems. A lot of pain when swallowing   Have you ever smoked? I have never smoked   Have you had a fever? No   When did your symptoms first appear? 8/19/2024   In the last two weeks, have you been in close contact with someone who has COVID-19 or the Flu? No   List what you have done or taken to help your symptoms. Cough drops, sore throat spray, ibuprofen   How severe are your symptoms? Severe   Have your symptoms gotten better or worse since they started?  No change   Do you have transportation to get testing if it is needed and ordered for you at an [facility] location? Yes   Provide any additional information you feel is important.    Please attach any relevant images or files     Are you able to take your vital signs? No          There are no problems to display for this patient.     Recent Labs Obtained:  Lab Results   Component Value Date    WBC 4.80 08/15/2024    HGB 15.2 08/15/2024    HCT 45.8 08/15/2024    MCV 93 08/15/2024     08/15/2024     08/15/2024    K 3.8 08/15/2024    GLU 86 08/15/2024    CREATININE 0.8 08/15/2024    EGFRNORACEVR >60 08/15/2024    TSH 1.028 08/15/2024      Review of patient's allergies indicates:   Allergen Reactions    Tapentadol Itching       Encounter Diagnosis   Name Primary?    Pharyngitis due to other organism Yes        No orders of the defined types were placed in this encounter.     Medications Ordered This Encounter   Medications    amoxicillin-clavulanate 875-125mg (AUGMENTIN) 875-125 mg per tablet     Sig: Take 1 tablet by mouth 2 (two) times daily. for 7 days     Dispense:  14 tablet     Refill:  0    naproxen (NAPROSYN) 500 MG tablet     Sig: Take 1 tablet (500 mg total) by mouth 2 (two) times daily as needed (pain).     Dispense:  14 tablet     Refill:  0        E-Visit Time Tracking:    Day 1 Time (in minutes): 14    Total Time (in minutes): 14      274}

## 2024-12-18 ENCOUNTER — OFFICE VISIT (OUTPATIENT)
Dept: INTERNAL MEDICINE | Facility: CLINIC | Age: 29
End: 2024-12-18

## 2024-12-18 VITALS
DIASTOLIC BLOOD PRESSURE: 92 MMHG | OXYGEN SATURATION: 100 % | HEART RATE: 80 BPM | SYSTOLIC BLOOD PRESSURE: 137 MMHG | BODY MASS INDEX: 23.41 KG/M2 | WEIGHT: 137.13 LBS | TEMPERATURE: 98 F | HEIGHT: 64 IN

## 2024-12-18 DIAGNOSIS — J06.9 VIRAL URI: Primary | ICD-10-CM

## 2024-12-18 DIAGNOSIS — J31.0 RHINITIS, UNSPECIFIED TYPE: ICD-10-CM

## 2024-12-18 LAB
CTP QC/QA: YES
CTP QC/QA: YES
FLUAV AG NPH QL: NEGATIVE
FLUBV AG NPH QL: NEGATIVE
S PYO RRNA THROAT QL PROBE: NEGATIVE

## 2024-12-18 PROCEDURE — 99999 PR PBB SHADOW E&M-EST. PATIENT-LVL III: CPT | Mod: PBBFAC,,, | Performed by: STUDENT IN AN ORGANIZED HEALTH CARE EDUCATION/TRAINING PROGRAM

## 2024-12-18 PROCEDURE — 99999PBSHW POCT RAPID STREP A: Mod: PBBFAC,,,

## 2024-12-18 PROCEDURE — 99999PBSHW POCT INFLUENZA A/B: Mod: 59,PBBFAC,,

## 2024-12-18 PROCEDURE — 99213 OFFICE O/P EST LOW 20 MIN: CPT | Mod: S$PBB,,, | Performed by: STUDENT IN AN ORGANIZED HEALTH CARE EDUCATION/TRAINING PROGRAM

## 2024-12-18 PROCEDURE — 99213 OFFICE O/P EST LOW 20 MIN: CPT | Mod: PBBFAC | Performed by: STUDENT IN AN ORGANIZED HEALTH CARE EDUCATION/TRAINING PROGRAM

## 2024-12-18 PROCEDURE — 87880 STREP A ASSAY W/OPTIC: CPT | Mod: PBBFAC | Performed by: STUDENT IN AN ORGANIZED HEALTH CARE EDUCATION/TRAINING PROGRAM

## 2024-12-18 NOTE — PROGRESS NOTES
"[facility]  Subjective:     Patient ID: Torie Wallace is a 29 y.o. female.  History of Present Illness    CHIEF COMPLAINT:  Patient presents today with a sore throat and congestion.    HISTORY OF PRESENT ILLNESS:  She reports sore throat beginning last week, initially presenting as throat itchiness. The throat discomfort is mild without pain on swallowing. She developed congestion and runny nose yesterday afternoon. She reports mild cough and denies fevers and chills. She reports swollen lymph nodes in the cervical region.    CURRENT MEDICATIONS:  She is taking DayQuil and NyQuil for symptom management.    ALLERGIES:  She reports having general allergies.    SOCIAL HISTORY:  She works in [employer] and [employer].       Current Outpatient Medications   Medication Instructions    valACYclovir (VALTREX) 500 mg, Oral, Daily     Objective:      Body mass index is 23.54 kg/m².  Vitals:    12/18/24 1531   BP: (!) 137/92   Pulse: 80   Temp: 97.7 °F (36.5 °C)   TempSrc: Oral   SpO2: 100%   Weight: 62.2 kg (137 lb 2 oz)   Height: 5' 4" (1.626 m)   PainSc:   6   PainLoc: Generalized     Physical Exam   Physical Exam    General: No acute distress. Normal appearance.  Head: Normocephalic.  Neck:  No cervical lymphadenopathy  Throat:  No pharyngeal exudates  Cardiovascular: Normal rate and rhythm. No murmur heard.  Lungs: Pulmonary effort is normal. Normal breath sounds. No wheezing. No rales.  Abdomen: Flat.  Musculoskeletal: No edema lower extremities.  Skin: Warm. Dry.  Neurological: Alert.  Psychiatric: Normal mood. Normal behavior.       Assessment:       1. Viral URI    2. Rhinitis, unspecified type        Plan:   Assessment & Plan      Negative for flu and strep today, had issues with COVID tests but can defer this since would not qualify for Paxlovid.     Patient presents with symptoms of a viral URI/common cold.  No signs or symptoms of bacterial etiology would warrant antibiotics " and no indication for systemic glucocorticoids.  Discussed use of over-the-counter cold and flu medications for symptoms including 1st generation antihistamines for nocturnal symptom relief and sleep.  Discussed risk of drowsiness and falls.  Discussed use of over-the-counter nasal corticosteroids and azelastine p.r.n. nasal congestion.      Viral URI  -     POCT Influenza A/B  -     POCT rapid strep A  -     Cancel: POCT COVID-19 Rapid Screening    Rhinitis, unspecified type  -     POCT rapid strep A        All of your core healthy metrics are met.    No follow-ups on file. or sooner prn (as needed)          Wili Morales  [facility]  [address] [address]  Phone [phone]  Fax [phone]    Part of this note is dictated using the M*Modal Fluency Direct word recognition program. It may contain word recognition mistakes or wrong word substitutions (commonly he/she and is/was substitutions) that were missed on review.    Part of this note was generated with the assistance of ambient listening technology. Verbal consent was obtained by the patient and accompanying visitor(s) for the recording of patient appointment to facilitate this note. I attest to having reviewed and edited the generated note for accuracy, though some syntax or spelling errors may persist. Please contact the author of this note for any clarification.

## 2025-03-19 ENCOUNTER — OFFICE VISIT (OUTPATIENT)
Dept: INTERNAL MEDICINE | Facility: CLINIC | Age: 30
End: 2025-03-19
Payer: COMMERCIAL

## 2025-03-19 VITALS
WEIGHT: 138.69 LBS | HEIGHT: 64 IN | SYSTOLIC BLOOD PRESSURE: 130 MMHG | HEART RATE: 65 BPM | DIASTOLIC BLOOD PRESSURE: 76 MMHG | BODY MASS INDEX: 23.68 KG/M2

## 2025-03-19 DIAGNOSIS — Z91.09 ENVIRONMENTAL ALLERGIES: Primary | ICD-10-CM

## 2025-03-19 DIAGNOSIS — F41.1 GENERALIZED ANXIETY DISORDER: ICD-10-CM

## 2025-03-19 PROCEDURE — 99999 PR PBB SHADOW E&M-EST. PATIENT-LVL IV: CPT | Mod: PBBFAC,,, | Performed by: STUDENT IN AN ORGANIZED HEALTH CARE EDUCATION/TRAINING PROGRAM

## 2025-03-19 PROCEDURE — 1159F MED LIST DOCD IN RCRD: CPT | Mod: CPTII,S$GLB,, | Performed by: STUDENT IN AN ORGANIZED HEALTH CARE EDUCATION/TRAINING PROGRAM

## 2025-03-19 PROCEDURE — 99214 OFFICE O/P EST MOD 30 MIN: CPT | Mod: S$GLB,,, | Performed by: STUDENT IN AN ORGANIZED HEALTH CARE EDUCATION/TRAINING PROGRAM

## 2025-03-19 PROCEDURE — 3008F BODY MASS INDEX DOCD: CPT | Mod: CPTII,S$GLB,, | Performed by: STUDENT IN AN ORGANIZED HEALTH CARE EDUCATION/TRAINING PROGRAM

## 2025-03-19 PROCEDURE — 3075F SYST BP GE 130 - 139MM HG: CPT | Mod: CPTII,S$GLB,, | Performed by: STUDENT IN AN ORGANIZED HEALTH CARE EDUCATION/TRAINING PROGRAM

## 2025-03-19 PROCEDURE — G2211 COMPLEX E/M VISIT ADD ON: HCPCS | Mod: S$GLB,,, | Performed by: STUDENT IN AN ORGANIZED HEALTH CARE EDUCATION/TRAINING PROGRAM

## 2025-03-19 PROCEDURE — 3078F DIAST BP <80 MM HG: CPT | Mod: CPTII,S$GLB,, | Performed by: STUDENT IN AN ORGANIZED HEALTH CARE EDUCATION/TRAINING PROGRAM

## 2025-03-19 RX ORDER — LEVOCETIRIZINE DIHYDROCHLORIDE 5 MG/1
5 TABLET, FILM COATED ORAL NIGHTLY
Qty: 90 TABLET | Refills: 1 | Status: SHIPPED | OUTPATIENT
Start: 2025-03-19

## 2025-03-19 RX ORDER — PREDNISONE 20 MG/1
20 TABLET ORAL DAILY
Qty: 5 TABLET | Refills: 0 | Status: SHIPPED | OUTPATIENT
Start: 2025-03-19 | End: 2025-03-24

## 2025-03-19 RX ORDER — AZELASTINE 1 MG/ML
2 SPRAY, METERED NASAL 2 TIMES DAILY
Qty: 30 ML | Refills: 2 | Status: SHIPPED | OUTPATIENT
Start: 2025-03-19 | End: 2026-03-19

## 2025-03-19 RX ORDER — SERTRALINE HYDROCHLORIDE 50 MG/1
TABLET, FILM COATED ORAL
Qty: 56 TABLET | Refills: 0 | Status: SHIPPED | OUTPATIENT
Start: 2025-03-19 | End: 2025-05-18

## 2025-03-19 NOTE — PROGRESS NOTES
Subjective:       Patient ID: Torie Wallace is a 30 y.o. female.    Chief Complaint: Environmental allergies [Z91.09]    Patient is known to me, not currently established with PCP. Here today for the following:    History of Present Illness      CURRENT SYMPTOMS:  She reports right-sided lymph node swelling, describing it as feeling like a swollen tonsil, accompanied by right-sided ear pain and jaw pain. She denies headaches.    RECENT ILLNESS:  She experienced a cold/upper respiratory infection a few weeks ago lasting less than a week with congestion, managed with Nyquil.    ANXIETY:  She reports worsening anxiety over the past 4-5 months with panic attacks at night and trouble sleeping. Her symptoms have been exacerbated by family issues, particularly with her parents, over the past 6 months. She has longstanding anxiety history. She denies having a current mental health provider and reports unsuccessful attempts to schedule therapy appointments.    PAST MEDICAL HISTORY:  She has a history of seasonal allergies but does not take regular allergy medications.    PAST PSYCHIATRIC HISTORY:  She previously took Lexapro 10mg for approximately two years but discontinued due to feeling emotionally numb and experiencing persistent anxiety despite medication use.      ROS:  ENT: +ear pain, +nasal congestion  Cardiovascular: -chest pain  Gastrointestinal: -nausea, -vomiting, -diarrhea  Musculoskeletal: +facial pain  Neurological: -headache  Psychiatric: +anxiety, +sleep difficulty, +panic attacks  Lymphatics: +swollen lymph nodes  Allergic: +seasonal allergies          Current Outpatient Medications   Medication Instructions    azelastine (ASTELIN) 274 mcg, Nasal, 2 times daily    levocetirizine (XYZAL) 5 mg, Oral, Nightly    predniSONE (DELTASONE) 20 mg, Oral, Daily    sertraline (ZOLOFT) 50 MG tablet Take 0.5 tablets (25 mg total) by mouth once daily for 8 days, THEN 1 tablet (50 mg total) once daily.    valACYclovir  "(VALTREX) 500 mg, Oral, Daily     Objective:      Vitals:    03/19/25 0818   BP: 130/76   Pulse: 65   Weight: 62.9 kg (138 lb 10.7 oz)   Height: 5' 4" (1.626 m)   PainSc:   1     Body mass index is 23.8 kg/m².    Physical Exam  Vitals reviewed.   Constitutional:       General: She is not in acute distress.     Appearance: Normal appearance. She is not ill-appearing or diaphoretic.   HENT:      Head: Normocephalic and atraumatic.      Right Ear: Tympanic membrane, ear canal and external ear normal. There is no impacted cerumen.      Left Ear: Tympanic membrane, ear canal and external ear normal. There is no impacted cerumen.      Nose: Congestion and rhinorrhea present. Rhinorrhea is clear.      Right Nostril: No foreign body, epistaxis, septal hematoma or occlusion.      Left Nostril: No foreign body, epistaxis, septal hematoma or occlusion.      Right Turbinates: Swollen. Not pale.      Left Turbinates: Swollen. Not pale.      Mouth/Throat:      Mouth: Mucous membranes are moist.      Pharynx: Oropharynx is clear. Posterior oropharyngeal erythema (moderate, posterior oropharynx) present. No pharyngeal swelling, oropharyngeal exudate or uvula swelling.   Eyes:      General: No scleral icterus.        Right eye: No discharge.         Left eye: No discharge.      Conjunctiva/sclera: Conjunctivae normal.   Neck:      Thyroid: No thyromegaly or thyroid tenderness.      Trachea: Trachea normal.   Cardiovascular:      Rate and Rhythm: Normal rate and regular rhythm.      Heart sounds: Normal heart sounds. No murmur heard.     No friction rub. No gallop.   Pulmonary:      Effort: Pulmonary effort is normal. No respiratory distress.      Breath sounds: Normal breath sounds. No stridor. No wheezing, rhonchi or rales.   Musculoskeletal:      Cervical back: Neck supple.   Lymphadenopathy:      Head:      Right side of head: No submandibular or posterior auricular adenopathy.      Left side of head: No submandibular or " posterior auricular adenopathy.      Cervical: No cervical adenopathy.      Right cervical: No superficial, deep or posterior cervical adenopathy.     Left cervical: No superficial, deep or posterior cervical adenopathy.      Upper Body:      Right upper body: No supraclavicular adenopathy.      Left upper body: No supraclavicular adenopathy.   Skin:     General: Skin is warm and dry.   Neurological:      Mental Status: She is alert. Mental status is at baseline.   Psychiatric:         Mood and Affect: Mood normal.         Behavior: Behavior normal.         Assessment:       1. Environmental allergies    2. Generalized anxiety disorder        Plan:   Environmental allergies  -     predniSONE (DELTASONE) 20 MG tablet; Take 1 tablet (20 mg total) by mouth once daily. for 5 days  Dispense: 5 tablet; Refill: 0  -     azelastine (ASTELIN) 137 mcg (0.1 %) nasal spray; 2 sprays (274 mcg total) by Nasal route 2 (two) times daily.  Dispense: 30 mL; Refill: 2  -     levocetirizine (XYZAL) 5 MG tablet; Take 1 tablet (5 mg total) by mouth every evening.  Dispense: 90 tablet; Refill: 1    Generalized anxiety disorder  -     Ambulatory referral/consult to Psychology; Future; Expected date: 03/26/2025  -     sertraline (ZOLOFT) 50 MG tablet; Take 0.5 tablets (25 mg total) by mouth once daily for 8 days, THEN 1 tablet (50 mg total) once daily.  Dispense: 56 tablet; Refill: 0      Assessment & Plan    IMPRESSION:  - Assessed swollen lymph nodes and nasal congestion, likely due to recent viral infection.  - Evaluated ear discomfort, noting fluid buildup but no signs of infection.  - Considered history of allergies and current allergy season in treatment plan.  - Determined oral steroids and nasal spray appropriate to reduce inflammation and nasal turbinate swelling.  - Assessed anxiety symptoms and history with SSRIs.    ENLARGED LYMPH NODES:  - Noted patient's report of swollen lymph nodes for approximately 2 weeks, particularly on  the right side.  - Confirmed enlarged and swollen lymph nodes upon exam, more pronounced on the right side.  - Assessed that the lymphadenopathy could be due to ongoing inflammation or a reaction to a recent infection.    NASAL AND SINUS DISORDERS:  - Examined both ears and found fluid present, more prominent in the right ear than the left.  - Explained the correlation between swollen nasal turbinates and ear pressure.  - Instructed patient to focus on addressing nasal congestion to help clear Eustachian tubes.  - Assessed that the nasal congestion is likely due to a previous viral infection or allergies, causing swollen nasal turbinates and increased mucus production.  - Prescribed azelastine nasal spray: 2 sprays in each nostril at night, with option to increase to twice daily if ear pressure persists.  - Initiated oral steroid therapy: low dose for 5 days to reduce inflammation.  - Recommend options such as Mucinex or neti pot for mucus management.  - Focused treatment on underlying causes such as nasal congestion and possible allergies.    ADJUSTMENT DISORDER WITH ANXIETY:  - Noted patient's report of worsening anxiety over the past 4-5 months, including nocturnal panic attacks and sleep disturbances.  - Acknowledged patient's history of anxiety and recent stressors contributing to exacerbation of symptoms.  - Explained that psychotherapy can be as efficacious as pharmacotherapy for anxiety management.  - Initiated Zoloft for anxiety management: 25 mg daily for 8 days, then increasing to 50 mg daily.  - Discussed potential side effects of SSRIs, including initial GI symptoms and timeline for effectiveness (4-6 weeks).  - Referred patient to counseling services.    FOLLOW-UP:  - Scheduled follow-up visit on April 28th at 10 AM to assess response to Zoloft.  - Instructed patient to contact the office if experiencing side effects or no improvement in anxiety symptoms by 2-3 weeks after initiating Zoloft.            Jazmin Gerardo MD  3/19/2025

## 2025-03-19 NOTE — PATIENT INSTRUCTIONS
For chest congestion try Mucinex 1,200 mg twice daily (must be well hydrated for the medication to work).   Sore throat can also be treated with warm tea with honey and salt water gargling (8 oz warm water with 1/4 tsp salt).   Try nasal saline rinses using only sterile or distilled water 1-2 times daily.   Ensure adequate hydration.   Sent azelastine for sinus congestion to pharmacy.    Start prednisone for symptom management.   Return to clinic in 3-5 days if symptoms worsening or unimproved.    Call to schedule with [facility] or psychology: [phone] or [phone]    Therapy/Psychology:   You may also call your insurance to find providers who are covered in your area.    [facility]   Phone: [phone]    [facility]   Address: [facility] [place] [address]   Phone: [phone]   [email or web address]     [facility] [address], [address]   Phone: [phone]   You can email for an appointment at: [email or web address]     [facility]   [address] [place] [address] [address], [facility], [address]   [email or web address]"[employer]/   Dr. Maribell Gurrola, [phone] or amilcar@"[employer]   Dr. Jerri Geller, [phone] or riaz@"[employer]     Torie Gonzalez LCSW (therapist) [phone]   [address] [place] [address]   Karuna MCINTYREW (therapist) [phone]   [address] [facility]   Elizabeth Leone LCSW (therapist) [phone]   [address] [place] [address]   TAMRA Walton LCSW (therapist) [phone]   Lanre Eugene [phone] (therapist) [address] [facility]   Saravanan Richey (therapist) [phone]  [address] Yolanda Walker (therapist) [phone] [address] [facility]     [facility] [phone] [address] [place] [address]     Online Therapist:     [email or web address]   [email or web address]     Free Guided  Meditations   Https://[email or web address]/audio   [email or web address]   [email or web address]

## 2025-04-08 ENCOUNTER — OFFICE VISIT (OUTPATIENT)
Dept: INTERNAL MEDICINE | Facility: CLINIC | Age: 30
End: 2025-04-08
Payer: COMMERCIAL

## 2025-04-08 ENCOUNTER — TELEPHONE (OUTPATIENT)
Dept: INTERNAL MEDICINE | Facility: CLINIC | Age: 30
End: 2025-04-08

## 2025-04-08 VITALS — WEIGHT: 138.69 LBS | BODY MASS INDEX: 23.68 KG/M2 | HEIGHT: 64 IN

## 2025-04-08 DIAGNOSIS — Z86.19 HISTORY OF CANDIDIASIS OF VAGINA: ICD-10-CM

## 2025-04-08 DIAGNOSIS — J30.2 SEASONAL ALLERGIC RHINITIS, UNSPECIFIED TRIGGER: ICD-10-CM

## 2025-04-08 DIAGNOSIS — J02.0 STREPTOCOCCAL PHARYNGITIS: ICD-10-CM

## 2025-04-08 DIAGNOSIS — H65.93 BILATERAL OTITIS MEDIA WITH EFFUSION: ICD-10-CM

## 2025-04-08 DIAGNOSIS — J00 ACUTE NASOPHARYNGITIS: Primary | ICD-10-CM

## 2025-04-08 LAB
CTP QC/QA: YES
CTP QC/QA: YES
FLUAV AG NPH QL: NEGATIVE
FLUBV AG NPH QL: NEGATIVE
S PYO RRNA THROAT QL PROBE: POSITIVE

## 2025-04-08 RX ORDER — FLUTICASONE PROPIONATE 50 MCG
1 SPRAY, SUSPENSION (ML) NASAL DAILY
Qty: 16 G | Refills: 11 | Status: SHIPPED | OUTPATIENT
Start: 2025-04-08

## 2025-04-08 RX ORDER — PENICILLIN V POTASSIUM 500 MG/1
500 TABLET, FILM COATED ORAL 2 TIMES DAILY
Qty: 20 TABLET | Refills: 0 | Status: SHIPPED | OUTPATIENT
Start: 2025-04-08 | End: 2025-04-18

## 2025-04-08 RX ORDER — BENZONATATE 200 MG/1
200 CAPSULE ORAL 3 TIMES DAILY PRN
Qty: 30 CAPSULE | Refills: 0 | Status: SHIPPED | OUTPATIENT
Start: 2025-04-08 | End: 2025-04-15

## 2025-04-08 RX ORDER — FLUCONAZOLE 150 MG/1
150 TABLET ORAL DAILY
Qty: 1 TABLET | Refills: 0 | Status: SHIPPED | OUTPATIENT
Start: 2025-04-08 | End: 2025-04-09

## 2025-04-08 NOTE — PROGRESS NOTES
The patient location is: [place]  The chief complaint leading to consultation is: Sore Throat  Visit type: Virtual visit with synchronous audio and video  Contact time spent with patient: 17 minutes  Each patient to whom he or she provides medical services by telemedicine is:  (1) informed of the relationship between the physician and patient and the respective role of any other health care provider with respect to management of the patient; and (2) notified that he or she may decline to receive medical services by telemedicine and may withdraw from such care at any time.    Subjective:       Patient ID: Torie Wallace is a 30 y.o. female who  has a past medical history of Abnormal Pap smear of cervix, MORENO (generalized anxiety disorder), HSV-2 (herpes simplex virus 2) infection, and PCOS (polycystic ovarian syndrome).    Chief Complaint: Sore Throat     History was obtained from the patient and supplemented through chart review.  She is a patient of Jazmin Gerardo MD.  Was last seen  for AR, anxiety.    Sore Throat   This is a new problem. The current episode started yesterday. The problem has been rapidly worsening. The pain is worse on the right side. There has been no fever. The pain is at a severity of 7/10. The pain is severe. Associated symptoms include coughing, ear pain, headaches, a plugged ear sensation, neck pain, swollen glands and trouble swallowing. Pertinent negatives include no abdominal pain, congestion, diarrhea, drooling, ear discharge, hoarse voice, shortness of breath, stridor or vomiting. She has tried NSAIDs for the symptoms. The treatment provided no relief.     She was seen 03/19/2025 for environmental allergies, right-sided lymph node swelling.  Also having right-sided ear pain, jaw pain.  Thought to be from recent viral URI.  On exam, TMs were normal, but with R>L ear fluid. + clear rhinorrhea, congestion, swollen bilateral nasal turbinates, + moderate posterior oropharyngeal edema.   No pharyngeal edema.  Lungs CTAB.  No LAD on exam.  Was treated with course of prednisone 20 for 5 days.  Prescribed Astelin, Xyzal.  Recommended Mucinex.    Symptoms started yesterday with sore throat. R sided cervical LAD.  This morning, awoke with increased sore throat, pain when swallowing and talking.  No SOB.  Hasn't checked for OP exudate, edema.      Slight rhinorrhea, postnasal drip.  No nasal congestion.  Mild dry cough, no difficulty expectorating sputum.     R>L otalgia. Some muffled hearing. No discharge.    No fever, myalgias.     No SOB, wheezing, chest tightness.  No h/o asthma or lung disease.    No sick contacts. Not around kids.    Has tried tylenol, drinking hot tea    Home COVID test: none  COVID risk score 0.    Centor score: 1-2    No allergies to abx.  She reports a history of yeast infections after taking antibiotics.    AR:   Environmental, seasonal in the spring.  Current symptoms are not too bad.  Not using nasal spray.    Review of Systems   HENT:  Positive for ear pain, sore throat and trouble swallowing. Negative for congestion, drooling, ear discharge and hoarse voice.    Respiratory:  Positive for cough. Negative for shortness of breath and stridor.    Gastrointestinal:  Negative for abdominal pain, diarrhea and vomiting.   Musculoskeletal:  Positive for neck pain.   Neurological:  Positive for headaches.       Past Medical History:   Diagnosis Date    Abnormal Pap smear of cervix     MORENO (generalized anxiety disorder)     HSV-2 (herpes simplex virus 2) infection     PCOS (polycystic ovarian syndrome)      Past Surgical History:   Procedure Laterality Date    AUGMENTATION OF BREAST      DILATION AND CURETTAGE OF UTERUS      AUB    PELVIC LAPAROSCOPY      pelvic pain- no endo     Family History   Problem Relation Name Age of Onset    Ovarian cancer Paternal Grandmother      Breast cancer Neg Hx      Colon cancer Neg Hx       Social History[1]  Objective:      Vitals:    04/08/25 1126  "  Weight: 62.9 kg (138 lb 10.7 oz)   Height: 5' 4" (1.626 m)      Physical Exam  Constitutional:       General: She is not in acute distress.     Appearance: She is not ill-appearing, toxic-appearing or diaphoretic.   HENT:      Head: Normocephalic.   Eyes:      General: No scleral icterus.        Right eye: No discharge.         Left eye: No discharge.   Neck:      Comments: R cervical LAD.  Pulmonary:      Effort: Pulmonary effort is normal. No respiratory distress.      Comments: Mild hoarseness.  No cough.  Speaking in complete sentences.  Skin:     Coloration: Skin is not pale.      Findings: No erythema.   Neurological:      Mental Status: She is alert.   Psychiatric:         Behavior: Behavior normal.         Lab Results   Component Value Date    WBC 4.80 08/15/2024    HGB 15.2 08/15/2024    HCT 45.8 08/15/2024     08/15/2024    CHOL 160 08/15/2024    TRIG 133 08/15/2024    HDL 53 08/15/2024    ALT 24 08/15/2024    AST 19 08/15/2024     08/15/2024    K 3.8 08/15/2024     08/15/2024    CREATININE 0.8 08/15/2024    BUN 10 08/15/2024    CO2 23 08/15/2024    TSH 1.028 08/15/2024       The ASCVD Risk score (Heath VINCENT, et al., 2019) failed to calculate for the following reasons:    The 2019 ASCVD risk score is only valid for ages 40 to 79    Assessment:       1. Acute nasopharyngitis    2. Seasonal allergic rhinitis, unspecified trigger    3. Bilateral otitis media with effusion    4. History of candidiasis of vagina    5. Streptococcal pharyngitis      Plan:     Torie Burris" was seen today for sore throat.    Diagnoses and all orders for this visit:    Acute nasopharyngitis  Comments:  Possible Strep. Nurse visit for testing. Rec hydration, antihistamines, nasal spray, antitussives, NSAIDs PRN. Discussed SE, return prompts. Letter for work.  Orders:  -     POCT rapid strep A  -     benzonatate (TESSALON) 200 MG capsule; Take 1 capsule (200 mg total) by mouth 3 (three) times daily as needed for " Cough.  -     fluticasone propionate (FLONASE) 50 mcg/actuation nasal spray; 1 spray (50 mcg total) by Each Nostril route once daily. May use 1 spray in each nostril twice a day during flares.  -     SARS Coronavirus 2 Antigen, POCT Manual Read  -     POCT Influenza A/B    Seasonal allergic rhinitis, unspecified trigger  Comments:  Discussed correct use of Flonase daily regularly, at least leading into and during problem months. Antihistamine PRN.  Orders:  -     fluticasone propionate (FLONASE) 50 mcg/actuation nasal spray; 1 spray (50 mcg total) by Each Nostril route once daily. May use 1 spray in each nostril twice a day during flares.    Bilateral otitis media with effusion  Comments:  Should gradually resolve with supportive treatment as above.    History of candidiasis of vagina  Comments:  Will prescribe Diflucan if antibiotic indicated for Strep.  Orders:  -     fluconazole (DIFLUCAN) 150 MG Tab; Take 1 tablet (150 mg total) by mouth once daily. for 1 day    Streptococcal pharyngitis  -     penicillin v potassium (VEETID) 500 MG tablet; Take 1 tablet (500 mg total) by mouth 2 (two) times a day. for 10 days    Addendum:   Testing was positive for strep.  Negative for flu, COVID.  Nurse Angelita notified of test results.  Already discussed implication of test results during her visit.  Will treat with penicillin b.i.d. for 10 days.  Will also treat with Diflucan due to history of vaginal candidiasis from antibiotics.     Side effects of medication(s) were discussed in detail and patient voiced understanding.  Patient will call back for any issues or complications.     I have spent a total of 40 minutes with the patient as well as reviewing the chart/medical record and placing orders on the day of the visit. This includes face to face time and non-face to face time preparing to see the patient (eg, review of tests), obtaining and/or reviewing separately obtained history, documenting clinical information in the  electronic or other health record, independently interpreting results and communicating results to the patient/family/caregiver, or care coordinator.    RTC PRN.  Keep upcoming appointment to f/u MORENO.       [1]   Social History  Socioeconomic History    Marital status: Single   Tobacco Use    Smoking status: Unknown   Substance and Sexual Activity    Alcohol use: Yes     Alcohol/week: 5.0 standard drinks of alcohol     Types: 5 Glasses of wine per week    Drug use: Never    Sexual activity: Yes     Birth control/protection: None, Condom     Social Drivers of Health     Financial Resource Strain: Low Risk  (4/8/2025)    Overall Financial Resource Strain (CARDIA)     Difficulty of Paying Living Expenses: Not hard at all   Food Insecurity: No Food Insecurity (4/8/2025)    Hunger Vital Sign     Worried About Running Out of Food in the Last Year: Never true     Ran Out of Food in the Last Year: Never true   Transportation Needs: No Transportation Needs (4/8/2025)    PRAPARE - Transportation     Lack of Transportation (Medical): No     Lack of Transportation (Non-Medical): No   Physical Activity: Insufficiently Active (4/8/2025)    Exercise Vital Sign     Days of Exercise per Week: 2 days     Minutes of Exercise per Session: 30 min   Stress: No Stress Concern Present (4/8/2025)    [employer] - Occupational Stress Questionnaire     Feeling of Stress : Only a little   Housing Stability: Low Risk  (4/8/2025)    Housing Stability Vital Sign     Unable to Pay for Housing in the Last Year: No     Homeless in the Last Year: No

## 2025-04-08 NOTE — PATIENT INSTRUCTIONS
I recommend plenty of rest and drinking plenty of fluids.  Tylenol and NSAIDs, such as ibuprofen, Motrin, naproxen can be helpful for sore throat, headache, ear pain, muscle and joint pain, sneezing, fatigue.  Chloroseptic spray, lozenges can also soothe a sore throat.  Mucinex twice a day as needed for cough, chest congestion.  Dextromethorphan (DM, Robitussin) helps with cough.  Over-the-counter antihistamines (Allegra, Claritin, Zyrtec) for runny nose.  Decongestants (Sudafed) can also be helpful.  Nasal saline once to twice a day.  Hand washing can help prevent the spread of respiratory illnesses, especially from younger children.    Along with wearing a mask, you should also:  use proper hand hygiene to prevent the spread of germs.  cover your mouth and nose with a tissue or your elbow when you sneeze.  clean frequently touched surfaces often.  bring as much fresh air into your home as possible.     All of your core healthy metrics are met.      Bj Vogt,     If you are due for any health screening(s) below please notify me so we can arrange them to be ordered and scheduled to maintain your health. Most healthy patients complete it. Don't lose out on improving your health.     All of your core healthy metrics are met.

## 2025-04-08 NOTE — TELEPHONE ENCOUNTER
----- Message from Karuna Wayne MD sent at 4/8/2025 11:47 AM CDT -----  1. Please schedule a nurse visit today for Strep, Flu and COVID swabs at [facility].   Thank you!

## 2025-04-08 NOTE — LETTER
April 8, 2025      [facility]  [address]  [facility] [address]  Phone: [phone]  Fax: [phone]       Patient: Torie Wallace   YOB: 1995  Date of Visit: 04/08/2025    To Whom It May Concern:    Laina Wallace  was at [facility] on 04/08/2025. The patient may return to work/school on 04/10/2025 with no restrictions. If you have any questions or concerns, or if I can be of further assistance, please do not hesitate to contact me.    Sincerely,    Angelita Sethi LPN

## 2025-04-08 NOTE — TELEPHONE ENCOUNTER
----- Message from Karuna Wayne MD sent at 4/8/2025 11:48 AM CDT -----  1. Please also write a letter to excuse from work.  Return to work on 04/10/2025 without restrictions.  Thank you!

## 2025-04-08 NOTE — LETTER
April 8, 2025      [facility]  [address]  [place] [address]  Phone: [phone]  Fax: [phone]       Patient: Torie Wallace   YOB: 1995  Date of Visit: 04/08/2025    To Whom It May Concern:    Laina Wallace  was at [facility] on 04/08/2025. The patient may return to work/school on 04/10/2025 with no restrictions. If you have any questions or concerns, or if I can be of further assistance, please do not hesitate to contact me.    Sincerely,    Angelita Sethi LPN

## 2025-04-08 NOTE — LETTER
April 8, 2025      [facility]  [address]  [facility] [address]  Phone: [phone]  Fax: [phone]       Patient: Torie Wallace   YOB: 1995  Date of Visit: 04/08/2025    To Whom It May Concern:    Laina Wallace  was at [facility] on 04/08/2025. The patient may return to work/school on 04/10/2025 with no restrictions. If you have any questions or concerns, or if I can be of further assistance, please do not hesitate to contact me.    Sincerely,    Angelita Sethi LPN

## 2025-04-17 DIAGNOSIS — F41.1 GENERALIZED ANXIETY DISORDER: ICD-10-CM

## 2025-04-17 RX ORDER — SERTRALINE HYDROCHLORIDE 50 MG/1
50 TABLET, FILM COATED ORAL DAILY
Qty: 90 TABLET | Refills: 1 | Status: SHIPPED | OUTPATIENT
Start: 2025-04-17

## 2025-04-17 NOTE — TELEPHONE ENCOUNTER
----- Message from Summer sent at 4/17/2025 12:17 PM CDT -----  Regarding: sertraline (ZOLOFT) 50 MG tablet  Type:  RX Refill Request Who Called: ptRefill or New Rx:refillRX Name and Strength:sertraline (ZOLOFT) 50 MG tabletHow is the patient currently taking it? (ex. 1XDay):Is this a 30 day or 90 day RX:Preferred Pharmacy with phone number:[phone] [place] [address] [facility] Phone: [phone]: [phone] or Mail Order:local Ordering Provider:Would the patient rather a call back or a response via [facility]? [facility] Call Back Number: [phone] Information:

## 2025-04-17 NOTE — TELEPHONE ENCOUNTER
No care due was identified.  [facility] Embedded Care Due Messages. Reference number: [id number].   4/17/2025 1:48:01 PM CDT

## 2025-05-05 ENCOUNTER — OFFICE VISIT (OUTPATIENT)
Dept: INTERNAL MEDICINE | Facility: CLINIC | Age: 30
End: 2025-05-05
Payer: COMMERCIAL

## 2025-05-05 VITALS
DIASTOLIC BLOOD PRESSURE: 80 MMHG | HEART RATE: 79 BPM | HEIGHT: 64 IN | BODY MASS INDEX: 23.45 KG/M2 | OXYGEN SATURATION: 99 % | SYSTOLIC BLOOD PRESSURE: 124 MMHG | WEIGHT: 137.38 LBS

## 2025-05-05 DIAGNOSIS — Z13.1 SCREENING FOR DIABETES MELLITUS: ICD-10-CM

## 2025-05-05 DIAGNOSIS — N92.0 MENORRHAGIA WITH REGULAR CYCLE: ICD-10-CM

## 2025-05-05 DIAGNOSIS — F41.1 GENERALIZED ANXIETY DISORDER: ICD-10-CM

## 2025-05-05 DIAGNOSIS — Z12.4 SCREENING FOR CERVICAL CANCER: ICD-10-CM

## 2025-05-05 DIAGNOSIS — E55.9 VITAMIN D DEFICIENCY: ICD-10-CM

## 2025-05-05 DIAGNOSIS — Z13.6 SCREENING FOR CARDIOVASCULAR CONDITION: ICD-10-CM

## 2025-05-05 DIAGNOSIS — G47.00 INSOMNIA, UNSPECIFIED TYPE: ICD-10-CM

## 2025-05-05 DIAGNOSIS — B00.1 COLD SORE: ICD-10-CM

## 2025-05-05 DIAGNOSIS — Z00.00 HEALTH MAINTENANCE EXAMINATION: Primary | ICD-10-CM

## 2025-05-05 DIAGNOSIS — Z23 NEED FOR VACCINATION: ICD-10-CM

## 2025-05-05 PROCEDURE — 3079F DIAST BP 80-89 MM HG: CPT | Mod: CPTII,S$GLB,, | Performed by: STUDENT IN AN ORGANIZED HEALTH CARE EDUCATION/TRAINING PROGRAM

## 2025-05-05 PROCEDURE — 99213 OFFICE O/P EST LOW 20 MIN: CPT | Mod: 25,S$GLB,, | Performed by: STUDENT IN AN ORGANIZED HEALTH CARE EDUCATION/TRAINING PROGRAM

## 2025-05-05 PROCEDURE — 90715 TDAP VACCINE 7 YRS/> IM: CPT | Mod: S$GLB,,, | Performed by: STUDENT IN AN ORGANIZED HEALTH CARE EDUCATION/TRAINING PROGRAM

## 2025-05-05 PROCEDURE — 99999 PR PBB SHADOW E&M-EST. PATIENT-LVL III: CPT | Mod: PBBFAC,,, | Performed by: STUDENT IN AN ORGANIZED HEALTH CARE EDUCATION/TRAINING PROGRAM

## 2025-05-05 PROCEDURE — 99395 PREV VISIT EST AGE 18-39: CPT | Mod: 25,S$GLB,, | Performed by: STUDENT IN AN ORGANIZED HEALTH CARE EDUCATION/TRAINING PROGRAM

## 2025-05-05 PROCEDURE — 1160F RVW MEDS BY RX/DR IN RCRD: CPT | Mod: CPTII,S$GLB,, | Performed by: STUDENT IN AN ORGANIZED HEALTH CARE EDUCATION/TRAINING PROGRAM

## 2025-05-05 PROCEDURE — G2211 COMPLEX E/M VISIT ADD ON: HCPCS | Mod: S$GLB,,, | Performed by: STUDENT IN AN ORGANIZED HEALTH CARE EDUCATION/TRAINING PROGRAM

## 2025-05-05 PROCEDURE — 90471 IMMUNIZATION ADMIN: CPT | Mod: S$GLB,,, | Performed by: STUDENT IN AN ORGANIZED HEALTH CARE EDUCATION/TRAINING PROGRAM

## 2025-05-05 PROCEDURE — 3008F BODY MASS INDEX DOCD: CPT | Mod: CPTII,S$GLB,, | Performed by: STUDENT IN AN ORGANIZED HEALTH CARE EDUCATION/TRAINING PROGRAM

## 2025-05-05 PROCEDURE — 3074F SYST BP LT 130 MM HG: CPT | Mod: CPTII,S$GLB,, | Performed by: STUDENT IN AN ORGANIZED HEALTH CARE EDUCATION/TRAINING PROGRAM

## 2025-05-05 PROCEDURE — 1159F MED LIST DOCD IN RCRD: CPT | Mod: CPTII,S$GLB,, | Performed by: STUDENT IN AN ORGANIZED HEALTH CARE EDUCATION/TRAINING PROGRAM

## 2025-05-05 RX ORDER — VALACYCLOVIR HYDROCHLORIDE 500 MG/1
500 TABLET, FILM COATED ORAL DAILY
Qty: 90 TABLET | Refills: 3 | Status: SHIPPED | OUTPATIENT
Start: 2025-05-05 | End: 2026-05-05

## 2025-05-05 RX ORDER — TRAZODONE HYDROCHLORIDE 50 MG/1
25 TABLET ORAL NIGHTLY PRN
Qty: 30 TABLET | Refills: 2 | Status: SHIPPED | OUTPATIENT
Start: 2025-05-05

## 2025-05-05 RX ORDER — SERTRALINE HYDROCHLORIDE 50 MG/1
50 TABLET, FILM COATED ORAL DAILY
Qty: 90 TABLET | Refills: 1 | Status: SHIPPED | OUTPATIENT
Start: 2025-05-05

## 2025-05-05 NOTE — PROGRESS NOTES
After obtaining consent, and per orders of Dr. Gerardo, injection of Tdap Lot [id number] Exp 01/2027 given in the RD by Parth. Patient tolerated well and band aid applied. Patient instructed to remain in clinic for 15 minutes afterwards, and to report any adverse reaction to me immediately.

## 2025-05-05 NOTE — PROGRESS NOTES
"Subjective:       Patient ID: Torie Wallace is a 30 y.o. female.    Chief Complaint: Health maintenance examination [Z00.00]    Patient is established with me, here today for the following:    History of Present Illness      ANXIETY:  She reports improvement in daily general anxiety with Zoloft, noting significant reduction in severity compared to pre-treatment. She still experiences some anxiety but at a much lower intensity. She describes racing thoughts at bedtime causing difficulty initiating sleep, particularly when thinking about tasks. She expresses satisfaction with Zoloft, reporting it has effectively managed her anxiety, including stomach aches, without making her feel "spaced out" as experienced with previous Lexapro treatment. She can now accomplish tasks without being overwhelmed by anxiety. She notes mild lethargy as a side effect but denies significant GI symptoms, with these symptoms actually improving since starting Zoloft due to reduced anxiety.    GYNECOLOGICAL HISTORY:  She has history of abnormal Pap smears requiring colposcopy(?). Menarche occurred at age 14. She has regular monthly cycles with particularly heavy flow during first two days, requiring super plus tampon changes every hour. She reports a history of PCOS with associated increased facial hair growth with age. She is not currently using birth control.    SURGICAL HISTORY:  History includes D&C for heavy menstrual bleeding, laparoscopy for endometriosis evaluation, breast augmentation, and wisdom teeth extraction.    FAMILY HISTORY:  Maternal great uncle had bowel/stomach cancer. Paternal grandmother had ovarian cancer and  of congestive heart failure. Both parents have diabetes. Father has hypertension. Paternal grandmother and her father had strokes, and paternal grandfather had COPD.    SOCIAL HISTORY:  She consumes alcohol once or twice per week, with 2-3 drinks per sitting. She reports occasional marijuana use and denies " "history of cigarette smoking.      ROS:  General: +difficulty falling asleep  Gastrointestinal: +indigestion  Psychiatric: +anxiety, +racing thoughts  Female Genitourinary: +excessive vaginal bleeding        Health maintenance -   Denies family history of colorectal cancer.  Denies family history of breast cancer.  Family history of ovarian cancer.  Last pap performed MAR2024.   History of abnormal pap smear.  Denies family history of osteoporosis.  Family history of cardiac disease.  UTD on HPV, COVID primary/booster vaccinations.  Due for influenza, Tdap, COVID vaccinations.  Never smoker.  Drinks alcohol 1-2 times weekly, 2-3 drinks per sitting.  Marijuana use occasionally.   Due for HIV and hepatitis C screening .  UTD on lipid screening.  Lab Results   Component Value Date    LDLCALC 80.4 08/15/2024   Due for diabetes screening.    Has regular menstrual cycles monthly.  Menstruation lasts for 5 days.  Endorses menorrhagia on days 1-2.  Began menarche at age 14.   Never previously pregnant.   Currently sexually active with male partner.  Not currently using contraception.      Current Outpatient Medications   Medication Instructions    fluticasone propionate (FLONASE) 50 mcg, Each Nostril, Daily, May use 1 spray in each nostril twice a day during flares.    levocetirizine (XYZAL) 5 mg, Oral, Nightly    sertraline (ZOLOFT) 50 mg, Oral, Daily    traZODone (DESYREL) 25 mg, Oral, Nightly PRN    valACYclovir (VALTREX) 500 mg, Oral, Daily     Objective:      Vitals:    05/05/25 1346   BP: 124/80   Pulse: 79   SpO2: 99%   Weight: 62.3 kg (137 lb 5.6 oz)   Height: 5' 4" (1.626 m)   PainSc: 0-No pain     Body mass index is 23.58 kg/m².    Physical Exam  Vitals reviewed.   Constitutional:       General: She is not in acute distress.     Appearance: Normal appearance. She is not ill-appearing or diaphoretic.   HENT:      Head: Normocephalic and atraumatic.      Right Ear: Tympanic membrane, ear canal and external ear " normal. There is no impacted cerumen.      Left Ear: Tympanic membrane, ear canal and external ear normal. There is no impacted cerumen.      Nose: Nose normal. No rhinorrhea.      Mouth/Throat:      Mouth: Mucous membranes are moist.      Pharynx: Oropharynx is clear. No oropharyngeal exudate or posterior oropharyngeal erythema.   Eyes:      General: No scleral icterus.        Right eye: No discharge.         Left eye: No discharge.      Conjunctiva/sclera: Conjunctivae normal.   Neck:      Thyroid: No thyromegaly or thyroid tenderness.      Trachea: Trachea normal.   Cardiovascular:      Rate and Rhythm: Normal rate and regular rhythm.      Heart sounds: Normal heart sounds. No murmur heard.     No friction rub. No gallop.   Pulmonary:      Effort: Pulmonary effort is normal. No respiratory distress.      Breath sounds: Normal breath sounds. No stridor. No wheezing, rhonchi or rales.   Abdominal:      General: There is no distension.      Palpations: Abdomen is soft.      Tenderness: There is no abdominal tenderness. There is no guarding or rebound.   Musculoskeletal:         General: No swelling or deformity.      Cervical back: Neck supple.   Lymphadenopathy:      Head:      Right side of head: No submandibular or posterior auricular adenopathy.      Left side of head: No submandibular or posterior auricular adenopathy.      Cervical: No cervical adenopathy.      Right cervical: No superficial, deep or posterior cervical adenopathy.     Left cervical: No superficial, deep or posterior cervical adenopathy.      Upper Body:      Right upper body: No supraclavicular adenopathy.      Left upper body: No supraclavicular adenopathy.   Skin:     General: Skin is warm and dry.   Neurological:      General: No focal deficit present.      Mental Status: She is alert. Mental status is at baseline.      Gait: Gait normal.   Psychiatric:         Mood and Affect: Mood normal.         Behavior: Behavior normal.          Assessment:       1. Health maintenance examination    2. Cold sore    3. Insomnia, unspecified type    4. Menorrhagia with regular cycle    5. Vitamin D deficiency    6. Screening for diabetes mellitus    7. Screening for cardiovascular condition    8. Need for vaccination    9. Generalized anxiety disorder    10. Screening for cervical cancer        Plan:   Health maintenance examination  -     Comprehensive Metabolic Panel; Future; Expected date: 05/05/2025  -     TSH; Future; Expected date: 05/05/2025  -     Lipid Panel; Future; Expected date: 05/05/2025  -     Hemoglobin A1C; Future; Expected date: 05/05/2025  -     CBC Auto Differential; Future; Expected date: 05/05/2025  -     Iron and TIBC; Future; Expected date: 05/05/2025  -     Ferritin; Future; Expected date: 05/05/2025    Cold sore  -     valACYclovir (VALTREX) 500 MG tablet; Take 1 tablet (500 mg total) by mouth once daily.  Dispense: 90 tablet; Refill: 3    Insomnia, unspecified type  -     traZODone (DESYREL) 50 MG tablet; Take 0.5 tablets (25 mg total) by mouth nightly as needed for Insomnia.  Dispense: 30 tablet; Refill: 2    Menorrhagia with regular cycle  -     Comprehensive Metabolic Panel; Future; Expected date: 05/05/2025  -     TSH; Future; Expected date: 05/05/2025  -     CBC Auto Differential; Future; Expected date: 05/05/2025  -     Iron and TIBC; Future; Expected date: 05/05/2025  -     Ferritin; Future; Expected date: 05/05/2025    Vitamin D deficiency    Screening for diabetes mellitus  -     Hemoglobin A1C; Future; Expected date: 05/05/2025    Screening for cardiovascular condition  -     Lipid Panel; Future; Expected date: 05/05/2025    Need for vaccination  -     Tdap (BOOSTRIX) vaccine injection 0.5 mL    Generalized anxiety disorder  -     sertraline (ZOLOFT) 50 MG tablet; Take 1 tablet (50 mg total) by mouth once daily.  Dispense: 90 tablet; Refill: 1    Screening for cervical cancer  -     Ambulatory referral/consult to  Obstetrics / Gynecology; Future; Expected date: 08/05/2025      Assessment & Plan      IMPRESSION:  - Assessed response to Zoloft for anxiety management, noting improvement in daily general anxiety but persistent racing thoughts at bedtime.  - Evaluated need for routine health screenings and vaccinations given age.  - Assessed risk factors based on family history of various conditions.  - Evaluated HPV vaccine status and importance of confirming complete vaccination series.  - Evaluated alcohol and marijuana use, noting potential for rebound anxiety with frequent marijuana use.    PLAN SUMMARY:  - Continue Zoloft for anxiety management; potential dosage increase if needed  - Refilled Valtrex prescription  - Recommend prenatal vitamins or vitamins for menstruating persons  - Ordered lab work: CBC, metabolic panel, lipid panel, thyroid function, and iron studies  - Prescribed trazodone 25 mg (half tablet) as needed for racing thoughts and sleep issues  - Administered tetanus vaccine  - Recommend magnesium glycinate 200-400 mg daily for potential sleep benefits  - Follow up with OBGYN in August  - Schedule follow-up visit in 3 months (August) to assess sleep issues and medication efficacy    ## ANXIETY DISORDER:  - Torie reports improvement in daily general anxiety with Zoloft, but still experiences some anxiety, especially racing thoughts at bedtime.  - No significant side effects from Zoloft except occasional GI upset, which is improving.  - Explained relationship between anxiety and physical symptoms, including GI issues.  - Will continue Zoloft prescription for ongoing anxiety management with potential dosage increase as an alternative management option if needed.    ## INSOMNIA:  - Torie reports difficulty falling asleep due to racing thoughts, though improvement in daytime anxiety has helped with sleep issues.  - Prescribed trazodone 25 mg (half tablet) as needed for racing thoughts and sleep issues, to be taken  1-2 hours before desired sleep time.  - Advised patient to try trazodone initially on a day off work to assess response.  - Additionally recommended magnesium glycinate 200-400 mg daily, to be taken 1-2 hours before bedtime for potential sleep benefits, with caution about possible laxative effect.    ## HEAVY MENSTRUAL BLEEDING:  - Torie reports heavy menstrual bleeding with excessive flow on the first 2 days requiring super plus tampons every hour.  - Recommend prenatal vitamins or vitamins formulated for menstruating women to manage iron levels due to heavy cycles.  - Will check iron levels in upcoming labs.    ## HISTORY OF CERVICAL DYSPLASIA:  - Torie reports history of abnormal Pap smears and colposcopy.  - Last Pap smear was abnormal, but follow-up swab was normal.  - Explained change in cervical cancer screening approach at age 30, including HPV testing, and discussed updated screening protocols now that patient is 30.    ## FAMILY HISTORY:  - Documented family history.    ## FOLLOW-UP AND ADDITIONAL INSTRUCTIONS:  - Scheduled follow up in 3 months (August) to check on sleep issues and medication efficacy.  - Torie should complete ordered lab work (CBC, metabolic panel, lipid panel, thyroid function, and iron studies) prior to follow-up visit.  - Administered tetanus vaccine today.  - Torie should follow up with OBGYN in August.  - Refilled Valtrex.  - Recommend caution with marijuana use due to potential effects.         45 minutes were spent in chart review, documentation and review of results, and evaluation, treatment, and counseling of patient on the same day of service.    Jazmin Gerardo MD  5/5/2025

## 2025-07-31 ENCOUNTER — LAB VISIT (OUTPATIENT)
Dept: LAB | Facility: OTHER | Age: 30
End: 2025-07-31
Attending: STUDENT IN AN ORGANIZED HEALTH CARE EDUCATION/TRAINING PROGRAM
Payer: COMMERCIAL

## 2025-07-31 DIAGNOSIS — Z13.1 SCREENING FOR DIABETES MELLITUS: ICD-10-CM

## 2025-07-31 DIAGNOSIS — N92.0 MENORRHAGIA WITH REGULAR CYCLE: ICD-10-CM

## 2025-07-31 DIAGNOSIS — Z00.00 HEALTH MAINTENANCE EXAMINATION: ICD-10-CM

## 2025-07-31 DIAGNOSIS — Z13.6 SCREENING FOR CARDIOVASCULAR CONDITION: ICD-10-CM

## 2025-07-31 LAB
ABSOLUTE EOSINOPHIL (OHS): 0.08 K/UL
ABSOLUTE MONOCYTE (OHS): 0.35 K/UL (ref 0.3–1)
ABSOLUTE NEUTROPHIL COUNT (OHS): 3.1 K/UL (ref 1.8–7.7)
ALBUMIN SERPL BCP-MCNC: 4.6 G/DL (ref 3.5–5.2)
ALP SERPL-CCNC: 59 UNIT/L (ref 40–150)
ALT SERPL W/O P-5'-P-CCNC: 19 UNIT/L (ref 10–44)
ANION GAP (OHS): 7 MMOL/L (ref 8–16)
AST SERPL-CCNC: 23 UNIT/L (ref 11–45)
BASOPHILS # BLD AUTO: 0.05 K/UL
BASOPHILS NFR BLD AUTO: 0.8 %
BILIRUB SERPL-MCNC: 0.3 MG/DL (ref 0.1–1)
BUN SERPL-MCNC: 12 MG/DL (ref 6–20)
CALCIUM SERPL-MCNC: 9.4 MG/DL (ref 8.7–10.5)
CHLORIDE SERPL-SCNC: 102 MMOL/L (ref 95–110)
CHOLEST SERPL-MCNC: 194 MG/DL (ref 120–199)
CHOLEST/HDLC SERPL: 2.4 {RATIO} (ref 2–5)
CO2 SERPL-SCNC: 28 MMOL/L (ref 23–29)
CREAT SERPL-MCNC: 0.8 MG/DL (ref 0.5–1.4)
EAG (OHS): 82 MG/DL (ref 68–131)
ERYTHROCYTE [DISTWIDTH] IN BLOOD BY AUTOMATED COUNT: 12.6 % (ref 11.5–14.5)
FERRITIN SERPL-MCNC: 9 NG/ML (ref 20–300)
GFR SERPLBLD CREATININE-BSD FMLA CKD-EPI: >60 ML/MIN/1.73/M2
GLUCOSE SERPL-MCNC: 84 MG/DL (ref 70–110)
HBA1C MFR BLD: 4.5 % (ref 4–5.6)
HCT VFR BLD AUTO: 43.8 % (ref 37–48.5)
HDLC SERPL-MCNC: 80 MG/DL (ref 40–75)
HDLC SERPL: 41.2 % (ref 20–50)
HGB BLD-MCNC: 14.7 GM/DL (ref 12–16)
IMM GRANULOCYTES # BLD AUTO: 0.01 K/UL (ref 0–0.04)
IMM GRANULOCYTES NFR BLD AUTO: 0.2 % (ref 0–0.5)
IRON SATN MFR SERPL: 22 % (ref 20–50)
IRON SERPL-MCNC: 99 UG/DL (ref 30–160)
LDLC SERPL CALC-MCNC: 91.2 MG/DL (ref 63–159)
LYMPHOCYTES # BLD AUTO: 2.58 K/UL (ref 1–4.8)
MCH RBC QN AUTO: 29.9 PG (ref 27–31)
MCHC RBC AUTO-ENTMCNC: 33.6 G/DL (ref 32–36)
MCV RBC AUTO: 89 FL (ref 82–98)
NONHDLC SERPL-MCNC: 114 MG/DL
NUCLEATED RBC (/100WBC) (OHS): 0 /100 WBC
PLATELET # BLD AUTO: 379 K/UL (ref 150–450)
PMV BLD AUTO: 10.5 FL (ref 9.2–12.9)
POTASSIUM SERPL-SCNC: 4.1 MMOL/L (ref 3.5–5.1)
PROT SERPL-MCNC: 7.4 GM/DL (ref 6–8.4)
RBC # BLD AUTO: 4.91 M/UL (ref 4–5.4)
RELATIVE EOSINOPHIL (OHS): 1.3 %
RELATIVE LYMPHOCYTE (OHS): 41.8 % (ref 18–48)
RELATIVE MONOCYTE (OHS): 5.7 % (ref 4–15)
RELATIVE NEUTROPHIL (OHS): 50.2 % (ref 38–73)
SODIUM SERPL-SCNC: 137 MMOL/L (ref 136–145)
TIBC SERPL-MCNC: 460 UG/DL (ref 250–450)
TRANSFERRIN SERPL-MCNC: 311 MG/DL (ref 200–375)
TRIGL SERPL-MCNC: 114 MG/DL (ref 30–150)
TSH SERPL-ACNC: 0.98 UIU/ML (ref 0.4–4)
WBC # BLD AUTO: 6.17 K/UL (ref 3.9–12.7)

## 2025-07-31 PROCEDURE — 80053 COMPREHEN METABOLIC PANEL: CPT

## 2025-07-31 PROCEDURE — 83540 ASSAY OF IRON: CPT

## 2025-07-31 PROCEDURE — 83036 HEMOGLOBIN GLYCOSYLATED A1C: CPT

## 2025-07-31 PROCEDURE — 80061 LIPID PANEL: CPT

## 2025-07-31 PROCEDURE — 82728 ASSAY OF FERRITIN: CPT

## 2025-07-31 PROCEDURE — 36415 COLL VENOUS BLD VENIPUNCTURE: CPT

## 2025-07-31 PROCEDURE — 85025 COMPLETE CBC W/AUTO DIFF WBC: CPT

## 2025-07-31 PROCEDURE — 84443 ASSAY THYROID STIM HORMONE: CPT

## 2025-08-06 ENCOUNTER — OFFICE VISIT (OUTPATIENT)
Dept: INTERNAL MEDICINE | Facility: CLINIC | Age: 30
End: 2025-08-06
Payer: COMMERCIAL

## 2025-08-06 VITALS
SYSTOLIC BLOOD PRESSURE: 122 MMHG | BODY MASS INDEX: 24.1 KG/M2 | DIASTOLIC BLOOD PRESSURE: 80 MMHG | HEIGHT: 64 IN | WEIGHT: 141.13 LBS | OXYGEN SATURATION: 99 % | HEART RATE: 80 BPM

## 2025-08-06 DIAGNOSIS — F41.1 GENERALIZED ANXIETY DISORDER: ICD-10-CM

## 2025-08-06 DIAGNOSIS — Z00.00 HEALTH MAINTENANCE EXAMINATION: Primary | ICD-10-CM

## 2025-08-06 DIAGNOSIS — F40.243 FEAR OF FLYING: ICD-10-CM

## 2025-08-06 PROCEDURE — 99999 PR PBB SHADOW E&M-EST. PATIENT-LVL III: CPT | Mod: PBBFAC,,, | Performed by: STUDENT IN AN ORGANIZED HEALTH CARE EDUCATION/TRAINING PROGRAM

## 2025-08-06 RX ORDER — SERTRALINE HYDROCHLORIDE 50 MG/1
75 TABLET, FILM COATED ORAL DAILY
Qty: 135 TABLET | Refills: 1 | Status: SHIPPED | OUTPATIENT
Start: 2025-08-06

## 2025-08-06 RX ORDER — SERTRALINE HYDROCHLORIDE 50 MG/1
50 TABLET, FILM COATED ORAL DAILY
Qty: 90 TABLET | Refills: 1 | Status: CANCELLED | OUTPATIENT
Start: 2025-08-06

## 2025-08-06 RX ORDER — ALPRAZOLAM 0.5 MG/1
0.25 TABLET ORAL EVERY 6 HOURS PRN
Qty: 30 TABLET | Refills: 0 | Status: SHIPPED | OUTPATIENT
Start: 2025-08-06

## 2025-08-06 NOTE — PROGRESS NOTES
Subjective:       Patient ID: Torie Wallace is a 30 y.o. female.    Chief Complaint: Health maintenance examination [Z00.00]    Patient is established with me, here today for the following:    Health maintenance -   Denies family history of colorectal cancer.  Denies family history of breast cancer.  Family history of ovarian cancer.  Last pap performed MAR2024.   History of abnormal pap smear.  Denies family history of osteoporosis.  Family history of cardiac disease.  UTD on HPV, COVID primary/booster, Tdap vaccinations.  Never smoker.  Drinks alcohol 1-2 times weekly, 2-3 drinks per sitting.  Marijuana use occasionally.   Due for HIV and hepatitis C screening .  Lab Results   Component Value Date    LDLCALC 91.2 07/31/2025     Lab Results   Component Value Date    HGBA1C 4.5 07/31/2025      Began menarche at age 14.   Never previously pregnant.   Currently sexually active with male partner.  Not currently using contraception.     Low ferritin -   Lab Results   Component Value Date    HGB 14.7 07/31/2025    HCT 43.8 07/31/2025    MCV 89 07/31/2025    RDW 12.6 07/31/2025     Lab Results   Component Value Date    IRON 99 07/31/2025    TRANSFERRIN 311 07/31/2025    TIBC 460 (H) 07/31/2025    LABIRON 22 07/31/2025      Lab Results   Component Value Date    FERRITIN 9.0 (L) 07/31/2025     Feels Zoloft has been working well, notes significant improvement in symptoms  Interested in trying a slight increase in the dose to see if improves residual symptoms  Has not been using trazodone often for insomnia  Feels sleep symptoms improved  Endorses significant anxiety associated with flying  Requests recommendations for anxiety associated with flying        Current Outpatient Medications   Medication Instructions    ALPRAZolam (XANAX) 0.25 mg, Oral, Every 6 hours PRN    fluticasone propionate (FLONASE) 50 mcg, Each Nostril, Daily, May use 1 spray in each nostril twice a day during flares.    sertraline (ZOLOFT) 75 mg, Oral,  "Daily    traZODone (DESYREL) 25 mg, Oral, Nightly PRN    valACYclovir (VALTREX) 500 mg, Oral, Daily     Objective:      Vitals:    08/06/25 1544   BP: 122/80   Pulse: 80   SpO2: 99%   Weight: 64 kg (141 lb 1.5 oz)   Height: 5' 4" (1.626 m)   PainSc: 0-No pain     Body mass index is 24.22 kg/m².    Physical Exam  Vitals reviewed.   Constitutional:       General: She is not in acute distress.     Appearance: She is not ill-appearing or diaphoretic.   Pulmonary:      Effort: Pulmonary effort is normal.   Skin:     General: Skin is warm and dry.   Neurological:      Mental Status: She is alert. Mental status is at baseline.   Psychiatric:         Mood and Affect: Mood normal.         Behavior: Behavior normal.         Assessment:       1. Health maintenance examination    2. Generalized anxiety disorder    3. Fear of flying        Plan:   Health maintenance examination  Reviewed age appropriate screenings and immunizations.    Generalized anxiety disorder  Increase Zoloft to 75 mg   RTC in 6 months for follow up.  -     sertraline (ZOLOFT) 50 MG tablet; Take 1.5 tablets (75 mg total) by mouth once daily.  Dispense: 135 tablet; Refill: 1    Fear of flying  Use PRN  RTC in 6 months for follow up.  -     ALPRAZolam (XANAX) 0.5 MG tablet; Take 0.5 tablets (0.25 mg total) by mouth every 6 (six) hours as needed for Anxiety (associated with flying).  Dispense: 30 tablet; Refill: 0        Jazmin Gerardo MD      8/6/2025      "

## 2025-08-06 NOTE — PATIENT INSTRUCTIONS
Magnesium glycinate 200-400 mg nightly     I recommend taking a multivitamin daily that contains 24-30 mg iron (such as blood builder or prenatal formula) to help improve these levels.

## 2025-09-02 ENCOUNTER — OFFICE VISIT (OUTPATIENT)
Dept: OBSTETRICS AND GYNECOLOGY | Facility: CLINIC | Age: 30
End: 2025-09-02
Payer: COMMERCIAL

## 2025-09-02 VITALS
WEIGHT: 142.88 LBS | HEART RATE: 73 BPM | SYSTOLIC BLOOD PRESSURE: 127 MMHG | HEIGHT: 64 IN | RESPIRATION RATE: 17 BRPM | DIASTOLIC BLOOD PRESSURE: 79 MMHG | BODY MASS INDEX: 24.39 KG/M2

## 2025-09-02 DIAGNOSIS — R87.610 ATYPICAL SQUAMOUS CELLS OF UNDETERMINED SIGNIFICANCE (ASCUS) ON PAPANICOLAOU SMEAR OF CERVIX: ICD-10-CM

## 2025-09-02 DIAGNOSIS — Z11.3 ROUTINE SCREENING FOR STI (SEXUALLY TRANSMITTED INFECTION): ICD-10-CM

## 2025-09-02 DIAGNOSIS — L68.0 FEMALE HIRSUTISM: ICD-10-CM

## 2025-09-02 DIAGNOSIS — Z01.419 ENCOUNTER FOR GYNECOLOGICAL EXAMINATION (GENERAL) (ROUTINE) WITHOUT ABNORMAL FINDINGS: Primary | ICD-10-CM

## 2025-09-02 DIAGNOSIS — Z12.4 PAP SMEAR FOR CERVICAL CANCER SCREENING: ICD-10-CM

## 2025-09-02 PROCEDURE — 3074F SYST BP LT 130 MM HG: CPT | Mod: CPTII,S$GLB,,

## 2025-09-02 PROCEDURE — 3078F DIAST BP <80 MM HG: CPT | Mod: CPTII,S$GLB,,

## 2025-09-02 PROCEDURE — 3044F HG A1C LEVEL LT 7.0%: CPT | Mod: CPTII,S$GLB,,

## 2025-09-02 PROCEDURE — 99999 PR PBB SHADOW E&M-EST. PATIENT-LVL III: CPT | Mod: PBBFAC,,,

## 2025-09-02 PROCEDURE — 87624 HPV HI-RISK TYP POOLED RSLT: CPT

## 2025-09-02 PROCEDURE — 1160F RVW MEDS BY RX/DR IN RCRD: CPT | Mod: CPTII,S$GLB,,

## 2025-09-02 PROCEDURE — 3008F BODY MASS INDEX DOCD: CPT | Mod: CPTII,S$GLB,,

## 2025-09-02 PROCEDURE — 99395 PREV VISIT EST AGE 18-39: CPT | Mod: S$GLB,,,

## 2025-09-02 PROCEDURE — 1159F MED LIST DOCD IN RCRD: CPT | Mod: CPTII,S$GLB,,

## 2025-09-02 RX ORDER — SPIRONOLACTONE 50 MG/1
50 TABLET, FILM COATED ORAL DAILY
Qty: 30 TABLET | Refills: 11 | Status: SHIPPED | OUTPATIENT
Start: 2025-09-02